# Patient Record
Sex: FEMALE | Race: WHITE | NOT HISPANIC OR LATINO | Employment: OTHER | URBAN - METROPOLITAN AREA
[De-identification: names, ages, dates, MRNs, and addresses within clinical notes are randomized per-mention and may not be internally consistent; named-entity substitution may affect disease eponyms.]

---

## 2017-01-01 PROBLEM — B34.2 CORONAVIRUS INFECTION: Status: ACTIVE | Noted: 2017-01-01

## 2017-07-26 RX ORDER — ACETAMINOPHEN 500 MG
1000 TABLET ORAL ONCE
Status: CANCELLED | OUTPATIENT
Start: 2017-07-27

## 2017-07-26 RX ORDER — CEFAZOLIN SODIUM 2 G/100ML
2 INJECTION, SOLUTION INTRAVENOUS ONCE
Status: CANCELLED | OUTPATIENT
Start: 2017-07-27

## 2017-08-01 ENCOUNTER — HOSPITAL ENCOUNTER (OUTPATIENT)
Dept: GENERAL RADIOLOGY | Facility: HOSPITAL | Age: 72
Discharge: HOME | End: 2017-08-01
Admitting: ORTHOPAEDIC SURGERY

## 2017-08-01 ENCOUNTER — APPOINTMENT (OUTPATIENT)
Dept: PREADMISSION TESTING | Facility: HOSPITAL | Age: 72
End: 2017-08-01

## 2017-08-01 ENCOUNTER — HOSPITAL ENCOUNTER (OUTPATIENT)
Dept: GENERAL RADIOLOGY | Facility: HOSPITAL | Age: 72
Discharge: HOME | End: 2017-08-01

## 2017-08-01 VITALS
OXYGEN SATURATION: 96 % | DIASTOLIC BLOOD PRESSURE: 84 MMHG | SYSTOLIC BLOOD PRESSURE: 122 MMHG | TEMPERATURE: 98.5 F | WEIGHT: 185 LBS | HEIGHT: 62 IN | HEART RATE: 70 BPM | RESPIRATION RATE: 16 BRPM | BODY MASS INDEX: 34.04 KG/M2

## 2017-08-01 LAB
ALBUMIN SERPL-MCNC: 4 G/DL (ref 3.5–5.2)
ALBUMIN/GLOB SERPL: 1.5 G/DL
ALP SERPL-CCNC: 78 U/L (ref 39–117)
ALT SERPL W P-5'-P-CCNC: 17 U/L (ref 1–33)
ANION GAP SERPL CALCULATED.3IONS-SCNC: 12.1 MMOL/L
APTT PPP: 28 SECONDS (ref 22.7–35.4)
AST SERPL-CCNC: 20 U/L (ref 1–32)
BASOPHILS # BLD AUTO: 0.02 10*3/MM3 (ref 0–0.2)
BASOPHILS NFR BLD AUTO: 0.4 % (ref 0–1.5)
BILIRUB SERPL-MCNC: 0.7 MG/DL (ref 0.1–1.2)
BILIRUB UR QL STRIP: NEGATIVE
BUN BLD-MCNC: 9 MG/DL (ref 8–23)
BUN/CREAT SERPL: 12 (ref 7–25)
CALCIUM SPEC-SCNC: 9 MG/DL (ref 8.6–10.5)
CHLORIDE SERPL-SCNC: 102 MMOL/L (ref 98–107)
CLARITY UR: ABNORMAL
CO2 SERPL-SCNC: 27.9 MMOL/L (ref 22–29)
COLOR UR: ABNORMAL
CREAT BLD-MCNC: 0.75 MG/DL (ref 0.57–1)
DEPRECATED RDW RBC AUTO: 51.2 FL (ref 37–54)
EOSINOPHIL # BLD AUTO: 0.12 10*3/MM3 (ref 0–0.7)
EOSINOPHIL NFR BLD AUTO: 2.4 % (ref 0.3–6.2)
ERYTHROCYTE [DISTWIDTH] IN BLOOD BY AUTOMATED COUNT: 13.3 % (ref 11.7–13)
GFR SERPL CREATININE-BSD FRML MDRD: 76 ML/MIN/1.73
GLOBULIN UR ELPH-MCNC: 2.7 GM/DL
GLUCOSE BLD-MCNC: 110 MG/DL (ref 65–99)
GLUCOSE UR STRIP-MCNC: NEGATIVE MG/DL
HCT VFR BLD AUTO: 40 % (ref 35.6–45.5)
HGB BLD-MCNC: 12.8 G/DL (ref 11.9–15.5)
HGB UR QL STRIP.AUTO: NEGATIVE
IMM GRANULOCYTES # BLD: 0 10*3/MM3 (ref 0–0.03)
IMM GRANULOCYTES NFR BLD: 0 % (ref 0–0.5)
INR PPP: 0.98 (ref 0.9–1.1)
KETONES UR QL STRIP: ABNORMAL
LEUKOCYTE ESTERASE UR QL STRIP.AUTO: NEGATIVE
LYMPHOCYTES # BLD AUTO: 0.92 10*3/MM3 (ref 0.9–4.8)
LYMPHOCYTES NFR BLD AUTO: 18.7 % (ref 19.6–45.3)
MCH RBC QN AUTO: 33.5 PG (ref 26.9–32)
MCHC RBC AUTO-ENTMCNC: 32 G/DL (ref 32.4–36.3)
MCV RBC AUTO: 104.7 FL (ref 80.5–98.2)
MONOCYTES # BLD AUTO: 0.34 10*3/MM3 (ref 0.2–1.2)
MONOCYTES NFR BLD AUTO: 6.9 % (ref 5–12)
NEUTROPHILS # BLD AUTO: 3.51 10*3/MM3 (ref 1.9–8.1)
NEUTROPHILS NFR BLD AUTO: 71.6 % (ref 42.7–76)
NITRITE UR QL STRIP: NEGATIVE
PH UR STRIP.AUTO: 6 [PH] (ref 5–8)
PLATELET # BLD AUTO: 166 10*3/MM3 (ref 140–500)
PMV BLD AUTO: 10.3 FL (ref 6–12)
POTASSIUM BLD-SCNC: 4.1 MMOL/L (ref 3.5–5.2)
PROT SERPL-MCNC: 6.7 G/DL (ref 6–8.5)
PROT UR QL STRIP: NEGATIVE
PROTHROMBIN TIME: 12.6 SECONDS (ref 11.7–14.2)
RBC # BLD AUTO: 3.82 10*6/MM3 (ref 3.9–5.2)
SODIUM BLD-SCNC: 142 MMOL/L (ref 136–145)
SP GR UR STRIP: 1.02 (ref 1–1.03)
UROBILINOGEN UR QL STRIP: ABNORMAL
WBC NRBC COR # BLD: 4.91 10*3/MM3 (ref 4.5–10.7)

## 2017-08-01 PROCEDURE — 81003 URINALYSIS AUTO W/O SCOPE: CPT | Performed by: ORTHOPAEDIC SURGERY

## 2017-08-01 PROCEDURE — 93010 ELECTROCARDIOGRAM REPORT: CPT | Performed by: INTERNAL MEDICINE

## 2017-08-01 PROCEDURE — A9270 NON-COVERED ITEM OR SERVICE: HCPCS | Performed by: ORTHOPAEDIC SURGERY

## 2017-08-01 PROCEDURE — 85610 PROTHROMBIN TIME: CPT | Performed by: ORTHOPAEDIC SURGERY

## 2017-08-01 PROCEDURE — 63710000001 MUPIROCIN 2 % OINTMENT 0.9 G SYRINGE: Performed by: ORTHOPAEDIC SURGERY

## 2017-08-01 PROCEDURE — 85025 COMPLETE CBC W/AUTO DIFF WBC: CPT | Performed by: ORTHOPAEDIC SURGERY

## 2017-08-01 PROCEDURE — 36415 COLL VENOUS BLD VENIPUNCTURE: CPT

## 2017-08-01 PROCEDURE — 71020 HC CHEST PA AND LATERAL: CPT

## 2017-08-01 PROCEDURE — 80053 COMPREHEN METABOLIC PANEL: CPT | Performed by: ORTHOPAEDIC SURGERY

## 2017-08-01 PROCEDURE — 93005 ELECTROCARDIOGRAM TRACING: CPT

## 2017-08-01 PROCEDURE — 73560 X-RAY EXAM OF KNEE 1 OR 2: CPT

## 2017-08-01 PROCEDURE — 85730 THROMBOPLASTIN TIME PARTIAL: CPT | Performed by: ORTHOPAEDIC SURGERY

## 2017-08-01 NOTE — DISCHARGE INSTRUCTIONS
Take the following medications the morning of surgery with a small sip of water:    METOPROLOL  IRBESARTAN  HYDROCODONE  BREO INHALER  SPIRIVA INHALER  ALBUTEROL INHALER      General Instructions:  • Do not eat solid food after midnight the night before surgery.  • You may drink clear liquids day of surgery but must stop at least one hour before your hospital arrival time @ 0900 AM STOP DRINKING!!!  • It is beneficial for you to have a clear drink that contains carbohydrates the day of surgery.  We suggest a 20 ounce bottle of Gatorade or Powerade for non-diabetic patients or a 20 ounce bottle of G2 or Powerade Zero for diabetic patients.     Clear liquids are liquids you can see through.  Nothing red in color.     Plain water                               Sports drinks  Sodas                                   Gelatin (Jell-O)  Fruit juices without pulp such as white grape juice and apple juice  Popsicles that contain no fruit or yogurt  Tea or coffee (no cream or milk added)  Gatorade / Powerade  G2 / Powerade Zero  • Patients who avoid smoking, chewing tobacco and alcohol for 4 weeks prior to surgery have a reduced risk of post-operative complications.  Quit smoking as many days before surgery as you can.  • Do not smoke, use chewing tobacco or drink alcohol the day of surgery.   • Bring your C-PAP machine.  • Bring any papers given to you in the doctor’s office.  • Wear clean comfortable clothes and socks.  • Do not wear contact lenses or make-up.  Bring a case for your glasses.   • Bring crutches or walker if applicable.  • Leave all other valuables and jewelry at home.  • The Pre-Admission Testing nurse will instruct you to bring medications if unable to obtain an accurate list in Pre-Admission Testing.        Preventing a Surgical Site Infection:  • For 2 to 3 days before surgery, avoid shaving with a razor because the razor can irritate skin and make it easier to develop an infection.  • The night prior to  surgery sleep in a clean bed with clean clothing.  Do not allow pets to sleep with you.  • Shower on the morning of surgery using a fresh bar of anti-bacterial soap (such as Dial) and clean washcloth.  Dry with a clean towel and dress in clean clothing.  • Ask your surgeon if you will be receiving antibiotics prior to surgery.  • Make sure you, your family, and all healthcare providers clean their hands with soap and water or an alcohol based hand  before caring for you or your wound.    Day of surgery: 08- ARRIVE @ 1000 AM REPORT TO THE MAIN OR   Upon arrival, a Pre-op nurse and Anesthesiologist will review your health history, obtain vital signs, and answer questions you may have.  The only belongings needed at this time will be your home medications and if applicable your C-PAP machine.  If you are staying overnight your family can leave the rest of your belongings in the car and bring them to your room later.  A Pre-op nurse will start an IV and you may receive medication in preparation for surgery, including something to help you relax.  Your family will be able to see you in the Pre-op area.  While you are in surgery your family should notify the waiting room  if they leave the waiting room area and provide a contact phone number.    Please be aware that surgery does come with discomfort.  We want to make every effort to control your discomfort so please discuss any uncontrolled symptoms with your nurse.   Your doctor will most likely have prescribed pain medications.      If you are going home after surgery you will receive individualized written care instructions before being discharged.  A responsible adult must drive you to and from the hospital on the day of your surgery and stay with you for 24 hours.    If you are staying overnight following surgery, you will be transported to your hospital room following the recovery period.  [facility] has all private  rooms.    If you have any questions please call Pre-Admission Testing at [phone].  Deductibles and co-payments are collected on the day of service. Please be prepared to pay the required co-pay, deductible or deposit on the day of service as defined by your plan.    2% CHLORAHEXIDINE GLUCONATE* CLOTH  Preparing or “prepping” skin before surgery can reduce the risk of infection at the surgical site. To make the process easier, Psychiatric has chosen disposable cloths moistened with a rinse-free, 2% Chlorhexidine Gluconate (CHG) antiseptic solution. The steps below outline the prepping process and should be carefully followed.        Use the prep cloth on the area that is circled in the diagram             Directions Night before Surgery 08- PM PRIOR TO SURGERY (LEFT LEG)  1) Shower using a fresh bar of anti-bacterial soap (such as Dial) and clean washcloth.  Use a clean towel to completely dry your skin.  2) Do not use any lotions, oils or creams on your skin.  3) Open the package and remove 1 cloth, wipe your skin for 30 seconds in a circular motion.  Allow to dry for 3 minutes.  4) Repeat #3 with second cloth.  5) Do not touch your eyes, ears, or mouth with the prep cloth.  6) Allow the wet prep solution to air dry.  7) Discard the prep cloth and wash your hands with soap and water.   8) Dress in clean bed clothes and sleep on fresh clean bed sheets.   9) You may experience some temporary itching after the prep.    Directions Day of Surgery 08- AM PRIOR TO SURGERY (LEFT LEG  1) Repeat steps 1,2,3,4,5,6,7, and 9.   2) Dress in clean clothes before coming to the hospital.    BACTROBAN NASAL OINTMENT  There are many germs normally in your nose. Bactroban is an ointment that will help reduce these germs. Please follow these instructions for Bactroban use:    ____The day before surgery in the morning  [id number]_______    ____The day before surgery in the evening               [id number]_______    ____The day of surgery in the morning    [id number]_______    **Squirt ½ package of Bactroban Ointment onto a cotton applicator and apply to inside of 1st nostril.  Squirt the remaining Bactroban and apply to the inside of the other nostril.

## 2017-08-02 NOTE — PROGRESS NOTES
Pre-Operative Orthopedic Assessment      Patient: Marylu Lunsford    YOB: 1945      Age/Gender: 72 y.o. female  Medical Record Number: [id number]  Surgical Procedure Planned: ID TOTAL KNEE ARTHROPLASTY [[id number]] (LT TOTAL KNEE ARTHROPLASTY)     Surgeon: Julian Oshea MD    Date of Surgery Planned: 8/14/2017    Question Value Score    Patient Score   1. What is your age group? 50-65 years  66-75 years  >75 years =2  =1  =0 1   2. Gender? Male  Female =2  =1 1   3. How far on average can you walk? (a block is 200 meters) Two blocks or more (+\- rest)  1-2 blocks (+\- rest)  Housebound (most of time) =2  =1  =0 1   4. Which gait aid to you use? (more often than not) None  Single-Point Stick  Crutches/Frame =2  =1  =0 1   5. Do you use community  supports? (home help, meals on wheels, district nursing) None or one per week  Two or more per week =1  =0 1   6. Will you live with someone who can care for you after your operation? Yes  No =3  =0 3      Your Score (out of 12)  8     Key Destination at Discharge from acute care predicted by score   Scores < 6  -- extended inpatient rehabilitation   Scores 6-9 -- additional intervention to discharge directly home (Rehabilitation in home)   Scores > 9 -- directly home         Discharge Disposition/Planning:     Patient Response   Discussed the Predicted discharge disposition needed based on RAPT Assessment with the patient.    yes   Patient selected discharge disposition:   Home vs SNF ([facility]   Out Patient Rehabilitation Facility of Choice:    n/a   Home Health Services Preferred:   undecided   Has the patient completed or been scheduled to complete pre-operative testing? Completed   Post-Operative Care Giver Name and Phone Number:    Undecided at this time     Subacute Inpatient Rehabilitation:  Complete this section only if planning inpatient services at a Subacute Facility     Patient Response   Subacute Facility Preferred (Please  list 2 facilities:   [facility] (pre registered)   Requires pre-certification for inpatient rehabilitation services?      No (Medicare)   Planned source of transportation to inpatient rehabilitation facility?   family    If choosing inpatient services at an Acute or Subacute Facility please list a subsequent back-up plan (in case patient fails to qualify for inpatient rehabilitation). Back-up plans should include caregiver (family member or friend) for first 24-48 post- -operatively.    yes   Home Equipment Patient Response   Does patient have a walker for home use?    yes   Does patient have a 3 in 1 commode for home use?    no   Does patient have a shower chair for home use?    no   Does patient have an elevated commode seat for home use? yes   Does patient have a cane for home use?    yes   Is there any other medical equipment in the home? If so,  List in comment section below no   Pre-Operative Class Attendance Patient Response   Attended or scheduled to attend the pre-operative class within 1 year of total joint replacement? Provided info on live class and online, unsure if will attend   Not planning to attend the pre-operative class (see comments below)    Hx of R/L hip surgery in the past   Patient Education  Completed   Expected time of discharge discussed yes   Encouraged to attend Pre-Operative Class    yes   Education re: Back-up plan for patients planning discharge to subacute facilities yes   Education re: Predicted Discharge Disposition based on RAPT score    no   Patient receptive and voiced understanding of information given    yes                                                                                                            Comments:    Spoke with patient Marylu Lunsford at [phone] via return call.  Verified home address to be correct on face sheet of [phone] [place] [address].  Lives alone in a house.  3 steps to the front entry with railing.  Everything she needs is all  on main level.  She stated that she uses a cane.  Hx of R/L hip surgery in the past.  Stated that she may be able to piece together help in the home of family, but did pre register for rehab stay at [facility].  Patient current discharge plan was to go to [facility] at discharge, but feel that she may be able to piece together help in the home and have home health follow (need to follow up with patient on home health agency preference if able to return home).  Kelsey Fang RN     8/7/2017 1332:Received inbound call from patient who informs that she hasn't been able to get anyone to say that they would stay with her after surgery.  She mentioned dtr is an ortho nurse and noted that she needed someone with her for at least 5 days after discharge.  Informed patient cant guarantee how long patient will be in the hospital, and if she will end up having a qualifying stay under medicare.  If doesn't have 3 midnights would be private pay at a skilled rehab facility.  Patient could also hire in-home care giver as needed if family/friends unable to assist, informed could provide list if needed. Recommended patient to also discuss home vs rehab with MD.  She is pre registered at [facility].  Kelsey Fang RN                                                Signature: Kelsey Fang RN    Date:  8/2/2017

## 2017-08-14 ENCOUNTER — HOSPITAL ENCOUNTER (INPATIENT)
Facility: HOSPITAL | Age: 72
LOS: 5 days | Discharge: NURSING, REHAB OR LONG-TERM CARE | End: 2017-08-19
Attending: ORTHOPAEDIC SURGERY | Admitting: ORTHOPAEDIC SURGERY

## 2017-08-14 ENCOUNTER — ANESTHESIA (OUTPATIENT)
Dept: PERIOP | Facility: HOSPITAL | Age: 72
End: 2017-08-14

## 2017-08-14 ENCOUNTER — ANESTHESIA EVENT (OUTPATIENT)
Dept: PERIOP | Facility: HOSPITAL | Age: 72
End: 2017-08-14

## 2017-08-14 ENCOUNTER — APPOINTMENT (OUTPATIENT)
Dept: GENERAL RADIOLOGY | Facility: HOSPITAL | Age: 72
End: 2017-08-14

## 2017-08-14 PROBLEM — M17.9 OSTEOARTHRITIS, KNEE: Status: ACTIVE | Noted: 2017-08-14

## 2017-08-14 PROCEDURE — 94799 UNLISTED PULMONARY SVC/PX: CPT

## 2017-08-14 PROCEDURE — C1713 ANCHOR/SCREW BN/BN,TIS/BN: HCPCS | Performed by: ORTHOPAEDIC SURGERY

## 2017-08-14 PROCEDURE — 25010000002 HYDROMORPHONE PER 4 MG: Performed by: NURSE ANESTHETIST, CERTIFIED REGISTERED

## 2017-08-14 PROCEDURE — 25010000003 CEFAZOLIN IN DEXTROSE 2-4 GM/100ML-% SOLUTION: Performed by: ORTHOPAEDIC SURGERY

## 2017-08-14 PROCEDURE — 0SRD0J9 REPLACEMENT OF LEFT KNEE JOINT WITH SYNTHETIC SUBSTITUTE, CEMENTED, OPEN APPROACH: ICD-10-PCS | Performed by: ORTHOPAEDIC SURGERY

## 2017-08-14 PROCEDURE — 94640 AIRWAY INHALATION TREATMENT: CPT

## 2017-08-14 PROCEDURE — 25010000002 MIDAZOLAM PER 1 MG: Performed by: ANESTHESIOLOGY

## 2017-08-14 PROCEDURE — 25010000002 ONDANSETRON PER 1 MG: Performed by: NURSE ANESTHETIST, CERTIFIED REGISTERED

## 2017-08-14 PROCEDURE — 73560 X-RAY EXAM OF KNEE 1 OR 2: CPT

## 2017-08-14 PROCEDURE — 25010000002 FENTANYL CITRATE (PF) 100 MCG/2ML SOLUTION: Performed by: NURSE ANESTHETIST, CERTIFIED REGISTERED

## 2017-08-14 PROCEDURE — 25010000003 CEFAZOLIN IN DEXTROSE 2-4 GM/100ML-% SOLUTION: Performed by: NURSE ANESTHETIST, CERTIFIED REGISTERED

## 2017-08-14 PROCEDURE — C1776 JOINT DEVICE (IMPLANTABLE): HCPCS | Performed by: ORTHOPAEDIC SURGERY

## 2017-08-14 PROCEDURE — 25010000002 KETOROLAC TROMETHAMINE PER 15 MG: Performed by: ORTHOPAEDIC SURGERY

## 2017-08-14 PROCEDURE — 25010000002 VANCOMYCIN 10 G RECONSTITUTED SOLUTION: Performed by: ORTHOPAEDIC SURGERY

## 2017-08-14 PROCEDURE — 25010000002 PHENYLEPHRINE PER 1 ML: Performed by: NURSE ANESTHETIST, CERTIFIED REGISTERED

## 2017-08-14 PROCEDURE — 25010000002 PROPOFOL 10 MG/ML EMULSION: Performed by: NURSE ANESTHETIST, CERTIFIED REGISTERED

## 2017-08-14 DEVICE — ART/SRF KN PERSONA/VE PS EF 6TO7 10MM LT: Type: IMPLANTABLE DEVICE | Status: FUNCTIONAL

## 2017-08-14 DEVICE — CAP TOTL KN CMT PRIMARY: Type: IMPLANTABLE DEVICE | Status: FUNCTIONAL

## 2017-08-14 DEVICE — IMPLANTABLE DEVICE: Type: IMPLANTABLE DEVICE | Status: FUNCTIONAL

## 2017-08-14 DEVICE — COMP FEM/KN PERSONA CR CMT COCR STD SZ6 LT: Type: IMPLANTABLE DEVICE | Status: FUNCTIONAL

## 2017-08-14 DEVICE — STEM TIB/KN PERSONA CMT 5D SZE LT: Type: IMPLANTABLE DEVICE | Status: FUNCTIONAL

## 2017-08-14 DEVICE — CMT BONE PALACOS 120001: Type: IMPLANTABLE DEVICE | Site: KNEE | Status: FUNCTIONAL

## 2017-08-14 RX ORDER — KETOROLAC TROMETHAMINE 30 MG/ML
30 INJECTION, SOLUTION INTRAMUSCULAR; INTRAVENOUS EVERY 6 HOURS
Status: DISPENSED | OUTPATIENT
Start: 2017-08-14 | End: 2017-08-15

## 2017-08-14 RX ORDER — ATORVASTATIN CALCIUM 20 MG/1
20 TABLET, FILM COATED ORAL DAILY
Status: DISCONTINUED | OUTPATIENT
Start: 2017-08-14 | End: 2017-08-19 | Stop reason: HOSPADM

## 2017-08-14 RX ORDER — MIDAZOLAM HYDROCHLORIDE 1 MG/ML
1 INJECTION INTRAMUSCULAR; INTRAVENOUS
Status: DISCONTINUED | OUTPATIENT
Start: 2017-08-14 | End: 2017-08-14 | Stop reason: HOSPADM

## 2017-08-14 RX ORDER — HYDRALAZINE HYDROCHLORIDE 20 MG/ML
5 INJECTION INTRAMUSCULAR; INTRAVENOUS
Status: DISCONTINUED | OUTPATIENT
Start: 2017-08-14 | End: 2017-08-14 | Stop reason: HOSPADM

## 2017-08-14 RX ORDER — ALBUTEROL SULFATE 2.5 MG/3ML
2.5 SOLUTION RESPIRATORY (INHALATION) ONCE AS NEEDED
Status: DISCONTINUED | OUTPATIENT
Start: 2017-08-14 | End: 2017-08-14 | Stop reason: HOSPADM

## 2017-08-14 RX ORDER — CLONAZEPAM 0.5 MG/1
0.75 TABLET ORAL 2 TIMES DAILY PRN
Status: DISCONTINUED | OUTPATIENT
Start: 2017-08-14 | End: 2017-08-16

## 2017-08-14 RX ORDER — SODIUM CHLORIDE, SODIUM LACTATE, POTASSIUM CHLORIDE, CALCIUM CHLORIDE 600; 310; 30; 20 MG/100ML; MG/100ML; MG/100ML; MG/100ML
9 INJECTION, SOLUTION INTRAVENOUS CONTINUOUS
Status: DISCONTINUED | OUTPATIENT
Start: 2017-08-14 | End: 2017-08-19 | Stop reason: HOSPADM

## 2017-08-14 RX ORDER — DIPHENHYDRAMINE HYDROCHLORIDE 50 MG/ML
12.5 INJECTION INTRAMUSCULAR; INTRAVENOUS
Status: DISCONTINUED | OUTPATIENT
Start: 2017-08-14 | End: 2017-08-14 | Stop reason: HOSPADM

## 2017-08-14 RX ORDER — SODIUM CHLORIDE, SODIUM LACTATE, POTASSIUM CHLORIDE, CALCIUM CHLORIDE 600; 310; 30; 20 MG/100ML; MG/100ML; MG/100ML; MG/100ML
100 INJECTION, SOLUTION INTRAVENOUS CONTINUOUS
Status: DISCONTINUED | OUTPATIENT
Start: 2017-08-14 | End: 2017-08-19 | Stop reason: HOSPADM

## 2017-08-14 RX ORDER — BISACODYL 10 MG
10 SUPPOSITORY, RECTAL RECTAL DAILY PRN
Status: DISCONTINUED | OUTPATIENT
Start: 2017-08-14 | End: 2017-08-19 | Stop reason: HOSPADM

## 2017-08-14 RX ORDER — PANTOPRAZOLE SODIUM 40 MG/1
40 TABLET, DELAYED RELEASE ORAL EVERY MORNING
Status: DISCONTINUED | OUTPATIENT
Start: 2017-08-15 | End: 2017-08-19 | Stop reason: HOSPADM

## 2017-08-14 RX ORDER — ONDANSETRON 2 MG/ML
4 INJECTION INTRAMUSCULAR; INTRAVENOUS ONCE AS NEEDED
Status: DISCONTINUED | OUTPATIENT
Start: 2017-08-14 | End: 2017-08-14 | Stop reason: HOSPADM

## 2017-08-14 RX ORDER — CEFAZOLIN SODIUM 2 G/100ML
2 INJECTION, SOLUTION INTRAVENOUS EVERY 8 HOURS
Status: COMPLETED | OUTPATIENT
Start: 2017-08-14 | End: 2017-08-15

## 2017-08-14 RX ORDER — FENTANYL CITRATE 50 UG/ML
50 INJECTION, SOLUTION INTRAMUSCULAR; INTRAVENOUS
Status: DISCONTINUED | OUTPATIENT
Start: 2017-08-14 | End: 2017-08-14 | Stop reason: HOSPADM

## 2017-08-14 RX ORDER — SENNA AND DOCUSATE SODIUM 50; 8.6 MG/1; MG/1
2 TABLET, FILM COATED ORAL 2 TIMES DAILY
Status: DISCONTINUED | OUTPATIENT
Start: 2017-08-14 | End: 2017-08-19 | Stop reason: HOSPADM

## 2017-08-14 RX ORDER — CEFAZOLIN SODIUM 2 G/100ML
INJECTION, SOLUTION INTRAVENOUS AS NEEDED
Status: DISCONTINUED | OUTPATIENT
Start: 2017-08-14 | End: 2017-08-14 | Stop reason: SURG

## 2017-08-14 RX ORDER — OXYCODONE HYDROCHLORIDE AND ACETAMINOPHEN 5; 325 MG/1; MG/1
2 TABLET ORAL EVERY 4 HOURS PRN
Status: DISCONTINUED | OUTPATIENT
Start: 2017-08-14 | End: 2017-08-16

## 2017-08-14 RX ORDER — DIPHENHYDRAMINE HCL 25 MG
25 CAPSULE ORAL EVERY 6 HOURS PRN
Status: DISCONTINUED | OUTPATIENT
Start: 2017-08-14 | End: 2017-08-19 | Stop reason: HOSPADM

## 2017-08-14 RX ORDER — SODIUM CHLORIDE 0.9 % (FLUSH) 0.9 %
1-10 SYRINGE (ML) INJECTION AS NEEDED
Status: DISCONTINUED | OUTPATIENT
Start: 2017-08-14 | End: 2017-08-19 | Stop reason: HOSPADM

## 2017-08-14 RX ORDER — IRBESARTAN 300 MG/1
300 TABLET ORAL DAILY
Status: DISCONTINUED | OUTPATIENT
Start: 2017-08-14 | End: 2017-08-15 | Stop reason: CLARIF

## 2017-08-14 RX ORDER — SODIUM CHLORIDE 0.9 % (FLUSH) 0.9 %
1-10 SYRINGE (ML) INJECTION AS NEEDED
Status: DISCONTINUED | OUTPATIENT
Start: 2017-08-14 | End: 2017-08-14 | Stop reason: HOSPADM

## 2017-08-14 RX ORDER — TRANEXAMIC ACID 100 MG/ML
INJECTION, SOLUTION INTRAVENOUS AS NEEDED
Status: DISCONTINUED | OUTPATIENT
Start: 2017-08-14 | End: 2017-08-14 | Stop reason: SURG

## 2017-08-14 RX ORDER — FENTANYL CITRATE 50 UG/ML
INJECTION, SOLUTION INTRAMUSCULAR; INTRAVENOUS AS NEEDED
Status: DISCONTINUED | OUTPATIENT
Start: 2017-08-14 | End: 2017-08-14 | Stop reason: SURG

## 2017-08-14 RX ORDER — MIDAZOLAM HYDROCHLORIDE 1 MG/ML
2 INJECTION INTRAMUSCULAR; INTRAVENOUS
Status: DISCONTINUED | OUTPATIENT
Start: 2017-08-14 | End: 2017-08-14 | Stop reason: HOSPADM

## 2017-08-14 RX ORDER — ASPIRIN 325 MG
325 TABLET, DELAYED RELEASE (ENTERIC COATED) ORAL DAILY
Status: DISCONTINUED | OUTPATIENT
Start: 2017-08-14 | End: 2017-08-17

## 2017-08-14 RX ORDER — ACETAMINOPHEN 325 MG/1
325 TABLET ORAL EVERY 4 HOURS PRN
Status: DISCONTINUED | OUTPATIENT
Start: 2017-08-14 | End: 2017-08-19 | Stop reason: HOSPADM

## 2017-08-14 RX ORDER — ONDANSETRON 2 MG/ML
INJECTION INTRAMUSCULAR; INTRAVENOUS AS NEEDED
Status: DISCONTINUED | OUTPATIENT
Start: 2017-08-14 | End: 2017-08-14 | Stop reason: SURG

## 2017-08-14 RX ORDER — HYDROMORPHONE HYDROCHLORIDE 1 MG/ML
0.5 INJECTION, SOLUTION INTRAMUSCULAR; INTRAVENOUS; SUBCUTANEOUS
Status: DISCONTINUED | OUTPATIENT
Start: 2017-08-14 | End: 2017-08-14 | Stop reason: HOSPADM

## 2017-08-14 RX ORDER — OXYCODONE HYDROCHLORIDE AND ACETAMINOPHEN 5; 325 MG/1; MG/1
1 TABLET ORAL EVERY 4 HOURS PRN
Status: DISCONTINUED | OUTPATIENT
Start: 2017-08-14 | End: 2017-08-16

## 2017-08-14 RX ORDER — NALOXONE HCL 0.4 MG/ML
0.1 VIAL (ML) INJECTION
Status: DISCONTINUED | OUTPATIENT
Start: 2017-08-14 | End: 2017-08-19 | Stop reason: HOSPADM

## 2017-08-14 RX ORDER — TERAZOSIN 2 MG/1
2 CAPSULE ORAL NIGHTLY
Status: DISCONTINUED | OUTPATIENT
Start: 2017-08-14 | End: 2017-08-19 | Stop reason: HOSPADM

## 2017-08-14 RX ORDER — ONDANSETRON 2 MG/ML
4 INJECTION INTRAMUSCULAR; INTRAVENOUS EVERY 6 HOURS PRN
Status: DISCONTINUED | OUTPATIENT
Start: 2017-08-14 | End: 2017-08-19 | Stop reason: HOSPADM

## 2017-08-14 RX ORDER — GLYCOPYRROLATE 0.2 MG/ML
INJECTION INTRAMUSCULAR; INTRAVENOUS AS NEEDED
Status: DISCONTINUED | OUTPATIENT
Start: 2017-08-14 | End: 2017-08-14 | Stop reason: SURG

## 2017-08-14 RX ORDER — METOPROLOL SUCCINATE 100 MG/1
100 TABLET, EXTENDED RELEASE ORAL DAILY
Status: DISCONTINUED | OUTPATIENT
Start: 2017-08-14 | End: 2017-08-19 | Stop reason: HOSPADM

## 2017-08-14 RX ORDER — ACETAMINOPHEN 500 MG
1000 TABLET ORAL ONCE
Status: COMPLETED | OUTPATIENT
Start: 2017-08-14 | End: 2017-08-14

## 2017-08-14 RX ORDER — ALBUTEROL SULFATE 2.5 MG/3ML
2.5 SOLUTION RESPIRATORY (INHALATION) EVERY 4 HOURS PRN
Status: DISCONTINUED | OUTPATIENT
Start: 2017-08-14 | End: 2017-08-19 | Stop reason: HOSPADM

## 2017-08-14 RX ORDER — BUDESONIDE AND FORMOTEROL FUMARATE DIHYDRATE 80; 4.5 UG/1; UG/1
2 AEROSOL RESPIRATORY (INHALATION)
Status: DISCONTINUED | OUTPATIENT
Start: 2017-08-14 | End: 2017-08-19 | Stop reason: HOSPADM

## 2017-08-14 RX ORDER — BISACODYL 5 MG/1
10 TABLET, DELAYED RELEASE ORAL DAILY PRN
Status: DISCONTINUED | OUTPATIENT
Start: 2017-08-14 | End: 2017-08-19 | Stop reason: HOSPADM

## 2017-08-14 RX ORDER — MAGNESIUM HYDROXIDE 1200 MG/15ML
LIQUID ORAL
Status: DISCONTINUED | OUTPATIENT
Start: 1840-12-31 | End: 2017-08-14 | Stop reason: HOSPADM

## 2017-08-14 RX ORDER — ONDANSETRON 4 MG/1
4 TABLET, FILM COATED ORAL EVERY 6 HOURS PRN
Status: DISCONTINUED | OUTPATIENT
Start: 2017-08-14 | End: 2017-08-19 | Stop reason: HOSPADM

## 2017-08-14 RX ORDER — DULOXETIN HYDROCHLORIDE 60 MG/1
60 CAPSULE, DELAYED RELEASE ORAL DAILY
Status: DISCONTINUED | OUTPATIENT
Start: 2017-08-14 | End: 2017-08-15

## 2017-08-14 RX ORDER — LABETALOL HYDROCHLORIDE 5 MG/ML
5 INJECTION, SOLUTION INTRAVENOUS
Status: DISCONTINUED | OUTPATIENT
Start: 2017-08-14 | End: 2017-08-14 | Stop reason: HOSPADM

## 2017-08-14 RX ORDER — FUROSEMIDE 20 MG/1
20 TABLET ORAL DAILY
Status: DISCONTINUED | OUTPATIENT
Start: 2017-08-14 | End: 2017-08-19 | Stop reason: HOSPADM

## 2017-08-14 RX ORDER — FAMOTIDINE 10 MG/ML
20 INJECTION, SOLUTION INTRAVENOUS ONCE
Status: COMPLETED | OUTPATIENT
Start: 2017-08-14 | End: 2017-08-14

## 2017-08-14 RX ORDER — ONDANSETRON 4 MG/1
4 TABLET, ORALLY DISINTEGRATING ORAL EVERY 6 HOURS PRN
Status: DISCONTINUED | OUTPATIENT
Start: 2017-08-14 | End: 2017-08-19 | Stop reason: HOSPADM

## 2017-08-14 RX ORDER — ALBUTEROL SULFATE 90 UG/1
2 AEROSOL, METERED RESPIRATORY (INHALATION) EVERY 4 HOURS PRN
Status: DISCONTINUED | OUTPATIENT
Start: 2017-08-14 | End: 2017-08-14 | Stop reason: CLARIF

## 2017-08-14 RX ORDER — ACETAMINOPHEN 325 MG/1
650 TABLET ORAL EVERY 4 HOURS PRN
Status: DISCONTINUED | OUTPATIENT
Start: 2017-08-14 | End: 2017-08-19 | Stop reason: HOSPADM

## 2017-08-14 RX ORDER — LIDOCAINE HYDROCHLORIDE 20 MG/ML
INJECTION, SOLUTION INFILTRATION; PERINEURAL AS NEEDED
Status: DISCONTINUED | OUTPATIENT
Start: 2017-08-14 | End: 2017-08-14 | Stop reason: SURG

## 2017-08-14 RX ORDER — FERROUS SULFATE 325(65) MG
325 TABLET ORAL
Status: DISCONTINUED | OUTPATIENT
Start: 2017-08-15 | End: 2017-08-19 | Stop reason: HOSPADM

## 2017-08-14 RX ORDER — PROPOFOL 10 MG/ML
VIAL (ML) INTRAVENOUS AS NEEDED
Status: DISCONTINUED | OUTPATIENT
Start: 2017-08-14 | End: 2017-08-14 | Stop reason: SURG

## 2017-08-14 RX ADMIN — FENTANYL CITRATE 25 MCG: 50 INJECTION INTRAMUSCULAR; INTRAVENOUS at 14:16

## 2017-08-14 RX ADMIN — VANCOMYCIN HYDROCHLORIDE 1500 MG: 10 INJECTION, POWDER, LYOPHILIZED, FOR SOLUTION INTRAVENOUS at 12:15

## 2017-08-14 RX ADMIN — BUDESONIDE AND FORMOTEROL FUMARATE DIHYDRATE 2 PUFF: 80; 4.5 AEROSOL RESPIRATORY (INHALATION) at 20:54

## 2017-08-14 RX ADMIN — PROPOFOL 230 MG: 10 INJECTION, EMULSION INTRAVENOUS at 13:43

## 2017-08-14 RX ADMIN — FENTANYL CITRATE 25 MCG: 50 INJECTION INTRAMUSCULAR; INTRAVENOUS at 13:50

## 2017-08-14 RX ADMIN — ONDANSETRON 4 MG: 2 INJECTION INTRAMUSCULAR; INTRAVENOUS at 13:49

## 2017-08-14 RX ADMIN — FENTANYL CITRATE 200 MCG: 50 INJECTION INTRAMUSCULAR; INTRAVENOUS at 13:58

## 2017-08-14 RX ADMIN — DULOXETINE HYDROCHLORIDE 60 MG: 60 CAPSULE, DELAYED RELEASE ORAL at 23:39

## 2017-08-14 RX ADMIN — HYDROMORPHONE HYDROCHLORIDE 0.5 MG: 1 INJECTION, SOLUTION INTRAMUSCULAR; INTRAVENOUS; SUBCUTANEOUS at 16:40

## 2017-08-14 RX ADMIN — CEFAZOLIN SODIUM 2 G: 2 INJECTION, SOLUTION INTRAVENOUS at 15:02

## 2017-08-14 RX ADMIN — OXYCODONE HYDROCHLORIDE AND ACETAMINOPHEN 2 TABLET: 5; 325 TABLET ORAL at 19:32

## 2017-08-14 RX ADMIN — OXYCODONE HYDROCHLORIDE AND ACETAMINOPHEN 2 TABLET: 5; 325 TABLET ORAL at 23:13

## 2017-08-14 RX ADMIN — CEFAZOLIN SODIUM 2 G: 2 INJECTION, SOLUTION INTRAVENOUS at 23:14

## 2017-08-14 RX ADMIN — TIOTROPIUM BROMIDE 1 CAPSULE: 18 CAPSULE ORAL; RESPIRATORY (INHALATION) at 20:53

## 2017-08-14 RX ADMIN — ASPIRIN 325 MG: 325 TABLET, DELAYED RELEASE ORAL at 23:16

## 2017-08-14 RX ADMIN — HYDROMORPHONE HYDROCHLORIDE 0.5 MG: 1 INJECTION, SOLUTION INTRAMUSCULAR; INTRAVENOUS; SUBCUTANEOUS at 16:12

## 2017-08-14 RX ADMIN — KETOROLAC TROMETHAMINE 30 MG: 30 INJECTION, SOLUTION INTRAMUSCULAR at 23:14

## 2017-08-14 RX ADMIN — ATORVASTATIN CALCIUM 20 MG: 20 TABLET, FILM COATED ORAL at 23:40

## 2017-08-14 RX ADMIN — ACETAMINOPHEN 1000 MG: 500 TABLET ORAL at 12:16

## 2017-08-14 RX ADMIN — DOCUSATE SODIUM -SENNOSIDES 2 TABLET: 50; 8.6 TABLET, COATED ORAL at 23:40

## 2017-08-14 RX ADMIN — PHENYLEPHRINE HYDROCHLORIDE 100 MCG: 10 INJECTION INTRAVENOUS at 13:52

## 2017-08-14 RX ADMIN — LIDOCAINE HYDROCHLORIDE 40 MG: 20 INJECTION, SOLUTION INFILTRATION; PERINEURAL at 13:43

## 2017-08-14 RX ADMIN — MIDAZOLAM 2 MG: 1 INJECTION INTRAMUSCULAR; INTRAVENOUS at 12:29

## 2017-08-14 RX ADMIN — FAMOTIDINE 20 MG: 10 INJECTION, SOLUTION INTRAVENOUS at 12:29

## 2017-08-14 RX ADMIN — SODIUM CHLORIDE, POTASSIUM CHLORIDE, SODIUM LACTATE AND CALCIUM CHLORIDE 100 ML/HR: 600; 310; 30; 20 INJECTION, SOLUTION INTRAVENOUS at 23:16

## 2017-08-14 RX ADMIN — TRANEXAMIC ACID 800 MG: 100 INJECTION, SOLUTION INTRAVENOUS at 14:43

## 2017-08-14 RX ADMIN — GLYCOPYRROLATE 0.2 MG: 0.2 INJECTION INTRAMUSCULAR; INTRAVENOUS at 13:58

## 2017-08-14 RX ADMIN — FENTANYL CITRATE 100 MCG: 50 INJECTION INTRAMUSCULAR; INTRAVENOUS at 14:08

## 2017-08-14 RX ADMIN — SODIUM CHLORIDE, POTASSIUM CHLORIDE, SODIUM LACTATE AND CALCIUM CHLORIDE: 600; 310; 30; 20 INJECTION, SOLUTION INTRAVENOUS at 15:06

## 2017-08-14 RX ADMIN — SODIUM CHLORIDE, POTASSIUM CHLORIDE, SODIUM LACTATE AND CALCIUM CHLORIDE 9 ML/HR: 600; 310; 30; 20 INJECTION, SOLUTION INTRAVENOUS at 12:20

## 2017-08-14 RX ADMIN — PHENYLEPHRINE HYDROCHLORIDE 50 MCG: 10 INJECTION INTRAVENOUS at 13:47

## 2017-08-14 NOTE — ANESTHESIA PREPROCEDURE EVALUATION
Anesthesia Evaluation     no history of anesthetic complications:         Airway   Mallampati: III  no difficulty expected  Dental    (+) lower dentures    Pulmonary - normal exam   (+) pneumonia (jan '17) resolved , a smoker (none today, cessation advised) Current, COPD, sleep apnea on CPAP,   (-) asthma    PE comment: nonlabored  Cardiovascular - normal exam    Rhythm: regular  Rate: normal    (+) hypertension, dysrhythmias (w/ PNA episode but none otherwise) Atrial Fib, hyperlipidemia  (-) valvular problems/murmurs, past MI, CAD, angina      Neuro/Psych  (+) syncope, psychiatric history Anxiety and Depression,    (-) seizures, TIA, CVA  GI/Hepatic/Renal/Endo    (+) obesity,    (-) GERD, liver disease, diabetes, hypothyroidism, hyperthyroidism    Musculoskeletal     (+) arthralgias, back pain,   Abdominal    Substance History      OB/GYN          Other   (+) arthritis       Other Comment: Venous insuff.                                  Anesthesia Plan    ASA 3     general     intravenous induction   Anesthetic plan and risks discussed with patient.

## 2017-08-14 NOTE — ANESTHESIA POSTPROCEDURE EVALUATION
Patient: Marylu Lunsford    Procedure Summary     Date Anesthesia Start Anesthesia Stop Room / Location    08/14/17 1340 1527 [facility] OR 10 / [facility] MAIN [facility]       Procedure Diagnosis Surgeon Provider    LT TOTAL KNEE ARTHROPLASTY (Left Knee) No diagnosis on file. MD Chris Hammer MD          Anesthesia Type: general  Last vitals  BP   151/87 (08/14/17 1730)    Temp        Pulse   64 (08/14/17 1730)   Resp   20 (08/14/17 1730)    SpO2   91 % (08/14/17 1730)      Post Anesthesia Care and Evaluation    Patient location during evaluation: PACU  Patient participation: complete - patient participated  Level of consciousness: awake  Pain score: 2  Pain management: adequate  Airway patency: patent  Anesthetic complications: No anesthetic complications  PONV Status: none  Cardiovascular status: acceptable  Respiratory status: acceptable  Hydration status: acceptable

## 2017-08-14 NOTE — PLAN OF CARE
Problem: Patient Care Overview (Adult)  Goal: Plan of Care Review  Outcome: Ongoing (interventions implemented as appropriate)    08/14/17 1141   Coping/Psychosocial Response Interventions   Plan Of Care Reviewed With patient   Patient Care Overview   Progress no change       Goal: Adult Individualization and Mutuality  Outcome: Ongoing (interventions implemented as appropriate)    08/14/17 1141   Individualization   Patient Specific Preferences None       Goal: Discharge Needs Assessment  Outcome: Ongoing (interventions implemented as appropriate)    08/14/17 1141   Discharge Needs Assessment   Concerns To Be Addressed no discharge needs identified         Problem: Perioperative Period (Adult)  Goal: Signs and Symptoms of Listed Potential Problems Will be Absent or Manageable (Perioperative Period)  Outcome: Ongoing (interventions implemented as appropriate)    08/14/17 1141   Perioperative Period   Problems Assessed (Perioperative Period) pain;infection   Problems Present (Perioperative Period) pain

## 2017-08-14 NOTE — PLAN OF CARE
Problem: Perioperative Period (Adult)  Goal: Signs and Symptoms of Listed Potential Problems Will be Absent or Manageable (Perioperative Period)  Outcome: Ongoing (interventions implemented as appropriate)    08/14/17 1610   Perioperative Period   Problems Assessed (Perioperative Period) all   Problems Present (Perioperative Period) pain

## 2017-08-14 NOTE — OP NOTE
Julian Oshea M.D. - [facility]    Patient Name:  Marylu Lunsford  YOB: 1945  Medical Records Number:  [id number]    Date of Procedure:  8/14/2017    Pre-operative Diagnosis:  DJD Left Knee    Post-operative Diagnosis:  DJD Left Knee    Procedure Performed:  Left Total Knee Replacement     Anesthesia: General, supplemented by a periarticular Exparel/bupivacaine injection.      Surgeon: Julian Oshea MD     Assistant: Mike Naylor MD; note that as part of the surgical procedure, I utilized service of an assistant surgeon, specifically Mike Naylor MD. Assistant surgeon participated in crucial portion of the operation. Use of the assistant surgeon greatly reduced overall operative time, thus significantly reducing overall morbidity for the patient.      Implants:      Implant Name Type Inv. Item Serial No.  Lot No. LRB No. Used Action   CMT BONE PALACOS [id number] - [id number] Implant CMT BONE PALACOS [id number]  [facility] [facility] [id number] Left 2 Implanted   COMP FEM PERSONA CR CMT COCR STD SZ6 LT - [id number] Implant COMP FEM PERSONA CR CMT COCR STD SZ6 LT  [employer] [facility] [id number] Left 1 Implanted   PAT PERSONA ALLPOLY CMT 8X29MM - [id number] Implant PAT PERSONA ALLPOLY CMT 8X29MM  [employer] [facility] [id number] Left 1 Implanted   STEM TIB PERSONA CMT 5D KIERRA LT - [id number] Implant STEM TIB PERSONA CMT 5D KIERRA LT  [employer] [facility] [id number] Left 1 Implanted   ART/SRF KN PERSONA/VE PS EF 6TO7 10MM LT - [id number] Implant ART/SRF KN PERSONA/VE PS EF 6TO7 10MM LT   [employer] [facility] [id number] Left 1 Implanted       Tourniquet Time: Was 44 minutes.      Medications: Note that we gave 1 g IV tranexamic acid when the cement was mixed.      Estimated Blood Loss: 0     Complications: None.      Quality Measures: I have documented the patient's current medications including dosage, frequency, and route of administration in medical record today. Conservative alternatives were discussed  with the patient as part of the decision-making process prior to the procedure. The patient was evaluated immediately preoperatively for cardiovascular and thromboembolic risk factors. There were no acute risk factors.  Prophylactic IV antibiotics were administered before the tourniquet went up.      Description of Procedure: After prep and drape, Left knee was approached via midline longitudinal anterior incision. Medial parapatellar arthrotomy was extended to the vastus medialis obliquus which was split in line with the fibers near the medial border, in keeping with minimally invasive technique. Patella was osteotomized proper depth for the patella implant. Edroy holes are created. Patella was subluxed and protected. Intramedullary instrumentation was used on each side of the joint; careful and thorough canal content irrigation. I cut the femur 10 mm depth, 5° valgus controlling rotation. The femur  was sized appropriatelyt. Anterior, posterior, and chamfer cuts were made. Tibia is cut 10 mm depth, 5° of posterior slope. Meniscectomies are completed. Trial reduction is performed. Rotation is marked and keel slot was created.      Bony surface was thoroughly cleaned and dried. I injected the posterior capsule and medial lateral gutter with Exparel solution containing 20 mL Exparel, 25 mL 0.25% bupivacaine with epinephrine, 20 mL saline. Care was taken to protect popliteal neurovascular structures and the peroneal nerve. I cemented the tibial baseplate,  Femur, and patella.  Trial reduction revealed a 10 mm MC polyethylene insert best balanced stability and range of motion, and is locked in the tibial tray.      Tourniquet was released; hemostasis obtained. Wound was closed in layers with #1 Vicryl on the capsule, 2-0 Vicryl in subcutaneous tissue, and staples in the skin over a 1/8-inch drain. Note that I injected my capsule with my Exparel solution during closure.      Julian Oshea MD  8/14/2017  3:20  PM

## 2017-08-15 LAB
ANION GAP SERPL CALCULATED.3IONS-SCNC: 10.1 MMOL/L
BUN BLD-MCNC: 10 MG/DL (ref 8–23)
BUN/CREAT SERPL: 12.5 (ref 7–25)
CALCIUM SPEC-SCNC: 9 MG/DL (ref 8.6–10.5)
CHLORIDE SERPL-SCNC: 105 MMOL/L (ref 98–107)
CO2 SERPL-SCNC: 27.9 MMOL/L (ref 22–29)
CREAT BLD-MCNC: 0.8 MG/DL (ref 0.57–1)
GFR SERPL CREATININE-BSD FRML MDRD: 70 ML/MIN/1.73
GLUCOSE BLD-MCNC: 104 MG/DL (ref 65–99)
HCT VFR BLD AUTO: 35.4 % (ref 35.6–45.5)
HGB BLD-MCNC: 11.8 G/DL (ref 11.9–15.5)
POTASSIUM BLD-SCNC: 4.2 MMOL/L (ref 3.5–5.2)
SODIUM BLD-SCNC: 143 MMOL/L (ref 136–145)

## 2017-08-15 PROCEDURE — 85018 HEMOGLOBIN: CPT | Performed by: ORTHOPAEDIC SURGERY

## 2017-08-15 PROCEDURE — 97110 THERAPEUTIC EXERCISES: CPT

## 2017-08-15 PROCEDURE — 97161 PT EVAL LOW COMPLEX 20 MIN: CPT

## 2017-08-15 PROCEDURE — 25010000003 CEFAZOLIN IN DEXTROSE 2-4 GM/100ML-% SOLUTION: Performed by: ORTHOPAEDIC SURGERY

## 2017-08-15 PROCEDURE — 25010000002 KETOROLAC TROMETHAMINE PER 15 MG: Performed by: ORTHOPAEDIC SURGERY

## 2017-08-15 PROCEDURE — 25010000002 FONDAPARINUX PER 0.5 MG: Performed by: ORTHOPAEDIC SURGERY

## 2017-08-15 PROCEDURE — 94799 UNLISTED PULMONARY SVC/PX: CPT

## 2017-08-15 PROCEDURE — 80048 BASIC METABOLIC PNL TOTAL CA: CPT | Performed by: ORTHOPAEDIC SURGERY

## 2017-08-15 PROCEDURE — 85014 HEMATOCRIT: CPT | Performed by: ORTHOPAEDIC SURGERY

## 2017-08-15 PROCEDURE — 97150 GROUP THERAPEUTIC PROCEDURES: CPT

## 2017-08-15 RX ORDER — LOSARTAN POTASSIUM 50 MG/1
100 TABLET ORAL
Status: DISCONTINUED | OUTPATIENT
Start: 2017-08-15 | End: 2017-08-15

## 2017-08-15 RX ORDER — DULOXETIN HYDROCHLORIDE 60 MG/1
60 CAPSULE, DELAYED RELEASE ORAL NIGHTLY
Status: DISCONTINUED | OUTPATIENT
Start: 2017-08-15 | End: 2017-08-19 | Stop reason: HOSPADM

## 2017-08-15 RX ORDER — LOSARTAN POTASSIUM 50 MG/1
100 TABLET ORAL NIGHTLY
Status: DISCONTINUED | OUTPATIENT
Start: 2017-08-15 | End: 2017-08-19 | Stop reason: HOSPADM

## 2017-08-15 RX ORDER — FONDAPARINUX SODIUM 2.5 MG/.5ML
2.5 INJECTION SUBCUTANEOUS ONCE
Status: COMPLETED | OUTPATIENT
Start: 2017-08-15 | End: 2017-08-15

## 2017-08-15 RX ADMIN — METOPROLOL SUCCINATE 100 MG: 100 TABLET, FILM COATED, EXTENDED RELEASE ORAL at 09:56

## 2017-08-15 RX ADMIN — PANTOPRAZOLE SODIUM 40 MG: 40 TABLET, DELAYED RELEASE ORAL at 06:13

## 2017-08-15 RX ADMIN — ATORVASTATIN CALCIUM 20 MG: 20 TABLET, FILM COATED ORAL at 20:13

## 2017-08-15 RX ADMIN — OXYCODONE HYDROCHLORIDE AND ACETAMINOPHEN 2 TABLET: 5; 325 TABLET ORAL at 20:13

## 2017-08-15 RX ADMIN — DULOXETINE HYDROCHLORIDE 60 MG: 60 CAPSULE, DELAYED RELEASE ORAL at 20:13

## 2017-08-15 RX ADMIN — FUROSEMIDE 20 MG: 20 TABLET ORAL at 09:56

## 2017-08-15 RX ADMIN — ASPIRIN 325 MG: 325 TABLET, DELAYED RELEASE ORAL at 09:55

## 2017-08-15 RX ADMIN — CEFAZOLIN SODIUM 2 G: 2 INJECTION, SOLUTION INTRAVENOUS at 06:13

## 2017-08-15 RX ADMIN — OXYCODONE HYDROCHLORIDE AND ACETAMINOPHEN 2 TABLET: 5; 325 TABLET ORAL at 03:25

## 2017-08-15 RX ADMIN — BUDESONIDE AND FORMOTEROL FUMARATE DIHYDRATE 2 PUFF: 80; 4.5 AEROSOL RESPIRATORY (INHALATION) at 20:37

## 2017-08-15 RX ADMIN — OXYCODONE HYDROCHLORIDE AND ACETAMINOPHEN 2 TABLET: 5; 325 TABLET ORAL at 07:18

## 2017-08-15 RX ADMIN — FERROUS SULFATE TAB 325 MG (65 MG ELEMENTAL FE) 325 MG: 325 (65 FE) TAB at 09:55

## 2017-08-15 RX ADMIN — BUDESONIDE AND FORMOTEROL FUMARATE DIHYDRATE 2 PUFF: 80; 4.5 AEROSOL RESPIRATORY (INHALATION) at 07:51

## 2017-08-15 RX ADMIN — OXYCODONE HYDROCHLORIDE AND ACETAMINOPHEN 2 TABLET: 5; 325 TABLET ORAL at 11:45

## 2017-08-15 RX ADMIN — FONDAPARINUX SODIUM 2.5 MG: 2.5 INJECTION, SOLUTION SUBCUTANEOUS at 09:55

## 2017-08-15 RX ADMIN — KETOROLAC TROMETHAMINE 30 MG: 30 INJECTION, SOLUTION INTRAMUSCULAR at 11:45

## 2017-08-15 RX ADMIN — KETOROLAC TROMETHAMINE 30 MG: 30 INJECTION, SOLUTION INTRAMUSCULAR at 06:13

## 2017-08-15 RX ADMIN — TIOTROPIUM BROMIDE 1 CAPSULE: 18 CAPSULE ORAL; RESPIRATORY (INHALATION) at 07:51

## 2017-08-15 RX ADMIN — DOCUSATE SODIUM -SENNOSIDES 2 TABLET: 50; 8.6 TABLET, COATED ORAL at 09:54

## 2017-08-15 RX ADMIN — DOCUSATE SODIUM -SENNOSIDES 2 TABLET: 50; 8.6 TABLET, COATED ORAL at 18:14

## 2017-08-15 RX ADMIN — OXYCODONE HYDROCHLORIDE AND ACETAMINOPHEN 2 TABLET: 5; 325 TABLET ORAL at 15:53

## 2017-08-15 NOTE — PROGRESS NOTES
"/69 (BP Location: Right arm, Patient Position: Lying)  Pulse 77  Temp 99.1 °F (37.3 °C) (Oral)   Resp 16  Ht 63\" (160 cm)  Wt 184 lb 14.4 oz (83.9 kg)  SpO2 93%  BMI 32.75 kg/m2    Lab Results (last 24 hours)     Procedure Component Value Units Date/Time    Hemoglobin & Hematocrit, Blood [[id number]]  (Abnormal) Collected:  08/15/17 0516    Specimen:  Blood Updated:  08/15/17 0546     Hemoglobin 11.8 (L) g/dL      Hematocrit 35.4 (L) %     Basic Metabolic Panel [[id number]]  (Abnormal) Collected:  08/15/17 0516    Specimen:  Blood Updated:  08/15/17 0601     Glucose 104 (H) mg/dL      BUN 10 mg/dL      Creatinine 0.80 mg/dL      Sodium 143 mmol/L      Potassium 4.2 mmol/L      Chloride 105 mmol/L      CO2 27.9 mmol/L      Calcium 9.0 mg/dL      eGFR Non African Amer 70 mL/min/1.73      BUN/Creatinine Ratio 12.5     Anion Gap 10.1 mmol/L     Narrative:       The MDRD GFR formula is only valid for adults with stable renal function between ages 18 and 70.          Imaging Results (last 24 hours)     Procedure Component Value Units Date/Time    XR Knee 1 or 2 View Left [[id number]] Collected:  08/14/17 1607     Updated:  08/14/17 1610    Narrative:       2 VIEWS OF THE LEFT KNEE     HISTORY: Postoperative knee pain     FINDINGS:  1. Satisfactory appearance following total knee arthroplasty, no  evidence of a complication.     This report was finalized on 8/14/2017 4:07 PM by Dr. Deuce Oliveira MD.             Patient Care Team:  Joyce Mark MD as PCP - General (Family Medicine)  Christopher Wells MD as Consulting Physician (Cardiology)  Jannette De La Cruz MD as Consulting Physician (Pulmonary Disease)    SUBJECTIVE  comfortable  PHYSICAL EXAM  NV intact   Active Problems:    Osteoarthritis, knee  History of superficial (not deep) DVT with childbirth    PLAN / DISPOSITION:  Arixtra one time dose  Lives alone - Rehab discharge Thursday  Julian Oshea MD  08/15/17  6:57 AM      "

## 2017-08-15 NOTE — THERAPY TREATMENT NOTE
Acute Care - Physical Therapy Treatment Note  [facility]     Patient Name: Marylu Lunsford  : 1945  MRN: [id number]  Today's Date: 8/15/2017  Onset of Illness/Injury or Date of Surgery Date: 17  Date of Referral to PT: 17  Referring Physician: Julian Brown    Admit Date: 2017    Visit Dx:  No diagnosis found.  Patient Active Problem List   Diagnosis   • Severe sepsis   • Atrial fibrillation   • COPD (chronic obstructive pulmonary disease)   • Venous insufficiency (chronic) (peripheral)   • Pneumonia of right upper lobe due to infectious organism   • Syncope   • Abnormal TSH   • DNR (do not resuscitate)   • Coronavirus infection   • Osteoarthritis, knee               Adult Rehabilitation Note       08/15/17 1300          Rehab Assessment/Intervention    Discipline physical therapy assistant  -RW      Document Type therapy note (daily note)  -RW      Subjective Information agree to therapy  -RW      Patient Effort, Rehab Treatment good  -RW      Precautions/Limitations fall precautions  -RW      Recorded by [RW] Maria Elena Diego PTA      Pain Assessment    Pain Assessment 0-10  -RW      Pain Score 7  -RW      Pain Type Acute pain;Surgical pain  -RW      Pain Location Knee  -RW      Pain Orientation Left  -RW      Pain Intervention(s) Medication (See MAR);Repositioned;Ambulation/increased activity  -RW      Response to Interventions tolerated  -RW      Recorded by [RW] Maria Elena Diego PTA      Cognitive Assessment/Intervention    Current Cognitive/Communication Assessment functional  -RW      Orientation Status oriented x 4  -RW      Follows Commands/Answers Questions 100% of the time  -RW      Personal Safety WNL/WFL  -RW      Personal Safety Interventions fall prevention program maintained;gait belt;nonskid shoes/slippers when out of bed  -RW      Recorded by [RW] Maria Elena Diego PTA      ROM (Range of Motion)    General ROM Detail L knee '  -RW      Recorded by [RW] Maria Elena FISHER  TAWANNA Diego      Bed Mobility, Assessment/Treatment    Bed Mob, Supine to Sit, [place] not tested  -RW      Bed Mob, Sit to Supine, [place] not tested  -RW      Bed Mobility, Comment Pt up in chair  -RW      Recorded by [RW] Maria Elena Diego PTA      Transfer Assessment/Treatment    Transfers, Sit-Stand [place] contact guard assist;verbal cues required  -RW      Transfers, Stand-Sit [place] contact guard assist;verbal cues required  -RW      Transfers, Sit-Stand-Sit, Assist Device rolling walker  -RW      Recorded by [RW] Maria Elena Diego PTA      Gait Assessment/Treatment    Gait, [place] Level contact guard assist;verbal cues required  -RW      Gait, Assistive Device rolling walker  -RW      Gait, Distance (Feet) 120  -RW      Gait, Gait Pattern Analysis swing-through gait  -RW      Gait, Gait Deviations festinating;left:;antalgic;leeanna decreased;step length decreased  -RW      Recorded by [RW] Maria Elena Diego PTA      Therapy Exercises    Exercise Protocols total knee  -RW      Total Knee Exercises left:;15 reps;completed protocol  -RW      Recorded by [RW] Maria Elena Diego PTA      Positioning and Restraints    Pre-Treatment Position sitting in chair/recliner  -RW      Post Treatment Position chair  -RW      In Chair reclined;call light within reach;encouraged to call for assist  -RW      Recorded by [RW] Maria Elena Diego PTA        User Key  (r) = Recorded By, (t) = Taken By, (c) = Cosigned By    Initials Name Effective Dates     Maria Elena Diego PTA 04/06/16 -                 IP PT Goals       08/15/17 0858          Bed Mobility PT LTG    Bed Mobility PT LTG, Date Established 08/15/17  -MS      Bed Mobility PT LTG, Time to Achieve 3 days  -MS      Bed Mobility PT LTG, Activity Type all bed mobility  -MS      Bed Mobility PT LTG, [place] Level supervision required  -MS      Transfer Training PT LTG    Transfer Training PT LTG, Date Established 08/15/17  -MS      Transfer  Training PT LTG, Time to Achieve 3 days  -MS      Transfer Training PT LTG, Activity Type all transfers  -MS      Transfer Training PT LTG, [place] Level supervision required  -MS      Transfer Training PT LTG, Assist Device walker, rolling  -MS      Gait Training PT LTG    Gait Training Goal PT LTG, Date Established 08/15/17  -MS      Gait Training Goal PT LTG, Time to Achieve 3 days  -MS      Gait Training Goal PT LTG, [place] Level supervision required  -MS      Gait Training Goal PT LTG, Assist Device walker, rolling  -MS      Gait Training Goal PT LTG, Distance to Achieve 100 feet  -MS      Range of Motion PT LTG    Range of Motion Goal PT LTG, Date Established 08/15/17  -MS      Range of Motion Goal PT LTG, Time to Achieve 3 days  -MS      Range fo Motion Goal PT LTG, Joint L knee  -MS      Range of Motion Goal PT LTG, AROM Measure (5, 85)  -MS        User Key  (r) = Recorded By, (t) = Taken By, (c) = Cosigned By    Initials Name Provider Type    MS [place] NATALIA Chase, PT Physical Therapist          Physical Therapy Education     Title: PT OT SLP Therapies (Done)     Topic: Physical Therapy (Done)     Point: Mobility training (Done)    Learning Progress Summary    Learner Readiness Method Response Comment Documented by Status   Patient Acceptance YARELIS NOBLE NR  MS 08/15/17 0857 Done               Point: Home exercise program (Done)    Learning Progress Summary    Learner Readiness Method Response Comment Documented by Status   Patient Acceptance YARELIS NOBLE NR  MS 08/15/17 0857 Done               Point: Body mechanics (Done)    Learning Progress Summary    Learner Readiness Method Response Comment Documented by Status   Patient Acceptance YARELIS NOBLE NR  MS 08/15/17 0857 Done               Point: Precautions (Done)    Learning Progress Summary    Learner Readiness Method Response Comment Documented by Status   Patient Acceptance YAREILS NOBLE NR  MS 08/15/17 0857 Done                      User Key     Initials Effective  Dates Name Provider Type Discipline    MS 12/01/15 -  Fransisco Chase PT Physical Therapist PT                    PT Recommendation and Plan  Anticipated Discharge Disposition: skilled nursing facility  Planned Therapy Interventions: balance training, bed mobility training, gait training, home exercise program, patient/family education, postural re-education, ROM (Range of Motion), strengthening, transfer training  PT Frequency: 2 times/day             Outcome Measures       08/15/17 0800          How much help from another person do you currently need...    Turning from your back to your side while in flat bed without using bedrails? 4  -MS      Moving from lying on back to sitting on the side of a flat bed without bedrails? 3  -MS      Moving to and from a bed to a chair (including a wheelchair)? 3  -MS      Standing up from a chair using your arms (e.g., wheelchair, bedside chair)? 3  -MS      Climbing 3-5 steps with a railing? 3  -MS      To walk in hospital room? 3  -MS      AM-PAC 6 Clicks Score 19  -MS      Functional Assessment    Outcome Measure Options AM-PAC 6 Clicks Basic Mobility (PT)  -MS        User Key  (r) = Recorded By, (t) = Taken By, (c) = Cosigned By    Initials Name Provider Type    MS Fransisco FISHER Rena, PT Physical Therapist           Time Calculation:         PT Charges       08/15/17 1310 08/15/17 0859       Time Calculation    Start Time 1309  -RW 0836  -MS     Stop Time 1415  -RW 0854  -MS     Time Calculation (min) 66 min  -RW 18 min  -MS     PT Received On 08/15/17  -RW 08/15/17  -MS     PT - Next Appointment 08/16/17  - 08/15/17  -MS     PT Goal Re-Cert Due Date  08/18/17  -MS       User Key  (r) = Recorded By, (t) = Taken By, (c) = Cosigned By    Initials Name Provider Type    MS Fransisco L Rena, PT Physical Therapist    VIVIANE Diego, PTA Physical Therapy Assistant          Therapy Charges for Today     Code Description Service Date Service Provider Modifiers Qty     62468609800  PT THER PROC EA 15 MIN 8/15/2017 Maria Elena Diego, PTA GP 1    48848609449 HC PT THER PROC GROUP 8/15/2017 Maria Elena Diego PTA GP 1          PT G-Codes  Outcome Measure Options: AM-PAC 6 Clicks Basic Mobility (PT)    Maria Elena Diego PTA  8/15/2017

## 2017-08-15 NOTE — THERAPY EVALUATION
Acute Care - Physical Therapy Initial Evaluation  [facility]     Patient Name: Marylu Lunsford  : 1945  MRN: [id number]  Today's Date: 8/15/2017   Onset of Illness/Injury or Date of Surgery Date: 17  Date of Referral to PT: 17  Referring Physician: Julian Brown      Admit Date: 2017     Visit Dx:  No diagnosis found.  Patient Active Problem List   Diagnosis   • Severe sepsis   • Atrial fibrillation   • COPD (chronic obstructive pulmonary disease)   • Venous insufficiency (chronic) (peripheral)   • Pneumonia of right upper lobe due to infectious organism   • Syncope   • Abnormal TSH   • DNR (do not resuscitate)   • Coronavirus infection   • Osteoarthritis, knee     Past Medical History:   Diagnosis Date   • Anxiety    • Arthritis     OSTEO   • Chronic back pain    • COPD (chronic obstructive pulmonary disease)    • Depression    • History of cellulitis     S/P RTHR-(INCISION)   • History of sepsis 2017    R/T BACTERIAL PNEUMONIA   • History of transfusion    • Hyperlipidemia    • Hypertension    • Insomnia    • Lower back pain    • Pneumonia, bacterial 2017   • Presence of partial dental prosthetic device     LOWER   • Sleep apnea     CPAP   • Venous insufficiency (chronic) (peripheral) 2016     Past Surgical History:   Procedure Laterality Date   • CRANIOTOMY      benign cyst on brainstem   • HIP ARTHROPLASTY Left    • STAPEDECTOMY     • TEETH EXTRACTION      PARTIAL PLATE LOWER   • TOTAL HIP ARTHROPLASTY Right    • TOTAL HIP ARTHROPLASTY REVISION Right    • TUBAL ABDOMINAL LIGATION     • VEIN LIGATION AND STRIPPING Bilateral           PT ASSESSMENT (last 72 hours)      PT Evaluation       08/15/17 0853 17 1213    Rehab Evaluation    Document Type evaluation  -MS     Subjective Information agree to therapy;complains of;fatigue;pain  -MS     Patient Effort, Rehab Treatment good  -MS     Symptoms Noted Comment Pt. reports pain/soreness in her Left knee as well as overall  fatigue this AM.  -MS     General Information    Onset of Illness/Injury or Date of Surgery Date 08/14/17  -MS     Referring Physician Julian Brown  -MS     Pertinent History Of Current Problem Pt. s/p Left TKR  -MS     Precautions/Limitations fall precautions  -MS     Prior Level of Function independent:  -MS     Equipment Currently Used at Home none  -MS none  -[facility]    Plans/Goals Discussed With patient;agreed upon  -MS     Risks Reviewed patient:  -MS     Benefits Reviewed patient:  -MS     Barriers to Rehab none identified  -MS     Living Environment    Lives With  alone  -GH    Living Arrangements  house  -GH    Home Accessibility  no concerns  -GH    Stair Railings at Home  none  -GH    Type of Financial/Environmental Concern  none  -GH    Transportation Available  car;family or friend will provide  -GH    Clinical Impression    Date of Referral to PT 08/14/17  -MS     Criteria for Skilled Therapeutic Interventions Met treatment indicated  -MS     Rehab Potential good, to achieve stated therapy goals  -MS     Pain Assessment    Pain Assessment 0-10  -MS     Pain Score 3  -MS     Post Pain Score 3  -MS     Pain Type Acute pain;Surgical pain  -MS     Pain Location Knee  -MS     Pain Orientation Left  -MS     Cognitive Assessment/Intervention    Current Cognitive/Communication Assessment functional  -MS     Orientation Status oriented x 4  -MS     Follows Commands/Answers Questions 100% of the time  -MS     Personal Safety WNL/WFL  -MS     Personal Safety Interventions fall prevention program maintained;gait belt;nonskid shoes/slippers when out of bed;supervised activity  -MS     ROM (Range of Motion)    General ROM --   BUE/RLE (WFL's)  -MS     MMT (Manual Muscle Testing)    General MMT Assessment --   BUE/RLE (4-/5)  -MS     Mobility Assessment/Training    Extremity Weight-Bearing Status left lower extremity  -MS     Left Lower Extremity Weight-Bearing weight-bearing as tolerated  -MS     Bed Mobility,  Assessment/Treatment    Bed Mob, Supine to Sit, [place] contact guard assist  -MS     Transfer Assessment/Treatment    Transfers, Sit-Stand [place] minimum assist (75% patient effort);2 person assist required  -MS     Transfers, Stand-Sit [place] minimum assist (75% patient effort);2 person assist required  -MS     Transfers, Sit-Stand-Sit, Assist Device rolling walker  -MS     Gait Assessment/Treatment    Gait, [place] Level contact guard assist;2 person assist required  -MS     Gait, Assistive Device rolling walker  -MS     Gait, Distance (Feet) 40  -MS     Gait, Gait Pattern Analysis swing-through gait  -MS     Gait, Gait Deviations left:;antalgic;leeanna decreased;forward flexed posture  -MS     Gait, Comment Pt. requires verbal/tactile cues for posture correction and RWX guidance.  -MS     Therapy Exercises    Exercise Protocols total knee  -MS     Total Knee Exercises left:;10 reps;completed protocol  -MS     Positioning and Restraints    Pre-Treatment Position in bed  -MS     Post Treatment Position chair  -MS     In Chair notified nsg;reclined;sitting;call light within reach;encouraged to call for assist   All lines intact. Ice pack to Left knee.  -MS       User Key  (r) = Recorded By, (t) = Taken By, (c) = Cosigned By    Initials Name Provider Type     Leia Soto, RN Registered Nurse    MS Fransisco Chase, PT Physical Therapist          Physical Therapy Education     Title: PT OT SLP Therapies (Done)     Topic: Physical Therapy (Done)     Point: Mobility training (Done)    Learning Progress Summary    Learner Readiness Method Response Comment Documented by Status   Patient Acceptance YARELIS NOBLE NR  MS 08/15/17 0857 Done               Point: Home exercise program (Done)    Learning Progress Summary    Learner Readiness Method Response Comment Documented by Status   Patient Acceptance YARELIS NOBLE NR  MS 08/15/17 0857 Done               Point: Body mechanics (Done)    Learning Progress  Summary    Learner Readiness Method Response Comment Documented by Status   Patient Acceptance YARELIS NOBLE NR  MS 08/15/17 0857 Done               Point: Precautions (Done)    Learning Progress Summary    Learner Readiness Method Response Comment Documented by Status   Patient Acceptance YARELIS NOBLE NR  MS 08/15/17 0857 Done                      User Key     Initials Effective Dates Name Provider Type Discipline    MS 12/01/15 -  Fransisco Chase, PT Physical Therapist PT                PT Recommendation and Plan  Anticipated Discharge Disposition: skilled nursing facility  Planned Therapy Interventions: balance training, bed mobility training, gait training, home exercise program, patient/family education, postural re-education, ROM (Range of Motion), strengthening, transfer training  PT Frequency: 2 times/day  Plan of Care Review  Plan Of Care Reviewed With: patient  Outcome Summary/Follow up Plan: Pt. will benefit from skilled inpt. P.T. to address her functional deficits and to assist pt. in regaining her independence with functional mobility.          IP PT Goals       08/15/17 0858          Bed Mobility PT LTG    Bed Mobility PT LTG, Date Established 08/15/17  -MS      Bed Mobility PT LTG, Time to Achieve 3 days  -MS      Bed Mobility PT LTG, Activity Type all bed mobility  -MS      Bed Mobility PT LTG, [place] Level supervision required  -MS      Transfer Training PT LTG    Transfer Training PT LTG, Date Established 08/15/17  -MS      Transfer Training PT LTG, Time to Achieve 3 days  -MS      Transfer Training PT LTG, Activity Type all transfers  -MS      Transfer Training PT LTG, [place] Level supervision required  -MS      Transfer Training PT LTG, Assist Device walker, rolling  -MS      Gait Training PT LTG    Gait Training Goal PT LTG, Date Established 08/15/17  -MS      Gait Training Goal PT LTG, Time to Achieve 3 days  -MS      Gait Training Goal PT LTG, [place] Level supervision required  -MS       Gait Training Goal PT LTG, Assist Device walker, rolling  -MS      Gait Training Goal PT LTG, Distance to Achieve 100 feet  -MS      Range of Motion PT LTG    Range of Motion Goal PT LTG, Date Established 08/15/17  -MS      Range of Motion Goal PT LTG, Time to Achieve 3 days  -MS      Range fo Motion Goal PT LTG, Joint L knee  -MS      Range of Motion Goal PT LTG, AROM Measure (5, 85)  -MS        User Key  (r) = Recorded By, (t) = Taken By, (c) = Cosigned By    Initials Name Provider Type    MS Fransisco FISHER Rena PT Physical Therapist                Outcome Measures       08/15/17 0800          How much help from another person do you currently need...    Turning from your back to your side while in flat bed without using bedrails? 4  -MS      Moving from lying on back to sitting on the side of a flat bed without bedrails? 3  -MS      Moving to and from a bed to a chair (including a wheelchair)? 3  -MS      Standing up from a chair using your arms (e.g., wheelchair, bedside chair)? 3  -MS      Climbing 3-5 steps with a railing? 3  -MS      To walk in hospital room? 3  -MS      AM-PAC 6 Clicks Score 19  -MS      Functional Assessment    Outcome Measure Options AM-PAC 6 Clicks Basic Mobility (PT)  -MS        User Key  (r) = Recorded By, (t) = Taken By, (c) = Cosigned By    Initials Name Provider Type    MS [place] NATALIA Chase PT Physical Therapist           Time Calculation:         PT Charges       08/15/17 0859          Time Calculation    Start Time 0836  -MS      Stop Time 0854  -MS      Time Calculation (min) 18 min  -MS      PT Received On 08/15/17  -MS      PT - Next Appointment 08/15/17  -MS      PT Goal Re-Cert Due Date 08/18/17  -MS        User Key  (r) = Recorded By, (t) = Taken By, (c) = Cosigned By    Initials Name Provider Type    MS Fransisco Chase PT Physical Therapist          Therapy Charges for Today     Code Description Service Date Service Provider Modifiers Qty    50138098439  PT EVAL LOW  COMPLEXITY 1 8/15/2017 Fransisco Chase, PT GP 1    94852175717 HC PT THER PROC EA 15 MIN 8/15/2017 Fransisco Chase, PT GP 1    42408504888 HC PT THER SUPP EA 15 MIN 8/15/2017 Fransisco Chase, PT GP 1          PT G-Codes  Outcome Measure Options: AM-PAC 6 Clicks Basic Mobility (PT)      Fransisco Chase, PT  8/15/2017

## 2017-08-15 NOTE — DISCHARGE PLACEMENT REQUEST
"Chaz Jimenez (72 y.o. Female)     Date of Birth Social Security Number Address Home Phone MRN    1945  [id number] [facility] [address] [phone] [id number]    Shinto Marital Status          Yarsani        Admission Date Admission Type Admitting Provider Attending Provider Department, Room/Bed    8/14/17 Elective Julian Oshea MD Sweet, Richard A, MD [address], P781/1    Discharge Date Discharge Disposition Discharge Destination                      Attending Provider: Julian Oshea MD     Allergies:  Doxycycline, Felodipine, Nsaids    Isolation:  None   Infection:  None   Code Status:  FULL    Ht:  63\" (160 cm)   Wt:  184 lb 14.4 oz (83.9 kg)    Admission Cmt:  None   Principal Problem:  None                Active Insurance as of 8/14/2017     Primary Coverage     Payor Plan Insurance Group Employer/Plan Group    MEDICARE MEDICARE A & B      Payor Plan Address Payor Plan Phone Number Effective From Effective To    [address] [phone] 6/1/2009     [place] [address]       Subscriber Name Subscriber Birth Date Member ID       CHAZ JIMENEZ 1945 [id number]           Secondary Coverage     Payor Plan Insurance Group Employer/Plan Group    [employer] [place] [employer] [place]      Payor Plan Address Payor Plan Phone Number Effective From Effective To    [employer] [place] [address] [phone] 1/1/2014     [place] [address]       Subscriber Name Subscriber Birth Date Member ID       CHAZ JIMENEZ 1945 [id number]                 Emergency Contacts      (Rel.) Home Phone Work Phone Mobile Phone    Dereje Powell (Son) -- -- [phone]    Igor Powell (Son) -- -- [phone]    KimberlynMarianna (Daughter) [phone] -- [phone]    Sunitha Powell (Relative) -- -- [phone]              "

## 2017-08-15 NOTE — PLAN OF CARE
Problem: Patient Care Overview (Adult)  Goal: Plan of Care Review    08/15/17 0858   Coping/Psychosocial Response Interventions   Plan Of Care Reviewed With patient   Outcome Evaluation   Outcome Summary/Follow up Plan Pt. will benefit from skilled inpt. P.T. to address her functional deficits and to assist pt. in regaining her independence with functional mobility.         Problem: Inpatient Physical Therapy  Goal: Bed Mobility Goal LTG- PT    08/15/17 0858   Bed Mobility PT LTG   Bed Mobility PT LTG, Date Established 08/15/17   Bed Mobility PT LTG, Time to Achieve 3 days   Bed Mobility PT LTG, Activity Type all bed mobility   Bed Mobility PT LTG, [place] Level supervision required       Goal: Transfer Training Goal 1 LTG- PT    08/15/17 0858   Transfer Training PT LTG   Transfer Training PT LTG, Date Established 08/15/17   Transfer Training PT LTG, Time to Achieve 3 days   Transfer Training PT LTG, Activity Type all transfers   Transfer Training PT LTG, [place] Level supervision required   Transfer Training PT LTG, Assist Device walker, rolling       Goal: Gait Training Goal LTG- PT    08/15/17 0858   Gait Training PT LTG   Gait Training Goal PT LTG, Date Established 08/15/17   Gait Training Goal PT LTG, Time to Achieve 3 days   Gait Training Goal PT LTG, [place] Level supervision required   Gait Training Goal PT LTG, Assist Device walker, rolling   Gait Training Goal PT LTG, Distance to Achieve 100 feet       Goal: Range of Motion Goal LTG- PT    08/15/17 0858   Range of Motion PT LTG   Range of Motion Goal PT LTG, Date Established 08/15/17   Range of Motion Goal PT LTG, Time to Achieve 3 days   Range fo Motion Goal PT LTG, Joint L knee   Range of Motion Goal PT LTG, AROM Measure (5, 85)

## 2017-08-15 NOTE — PROGRESS NOTES
Discharge Planning Assessment  [facility]     Patient Name: Marylu Lunsford  MRN: [id number]  Today's Date: 8/15/2017    Admit Date: 8/14/2017          Discharge Needs Assessment       08/15/17 0918    Discharge Needs Assessment    Equipment Currently Used at Home cane, straight      08/15/17 0916    Living Environment    Lives With alone    Living Arrangements house    Home Accessibility no concerns    Type of Financial/Environmental Concern none    Transportation Available car;family or friend will provide    Living Environment    Quality Of Family Relationships involved    Discharge Needs Assessment    Readmission Within The Last 30 Days no previous admission in last 30 days    Equipment Currently Used at Home cane, straight    Discharge Disposition skilled nursing facility;home healthcare service            Discharge Plan       08/15/17 0919    Case Management/Social Work Plan    Plan [facility]    Patient/Family In Agreement With Plan yes    Additional Comments Facesheet and d/c plan verified, pt lives alone and is pre-registered / [facility]. Marcia/Leonel notified of admission and will check bed availability, planning for d/c to [facility] on Thursday 8/17.         Discharge Placement     Facility/Agency Request Status Selected? Address Phone Number Fax Number    [facility] Pending - Request Sent     [address] [address] [phone]        Kenna Sauer RN 8/15/2017 09:21    8/15/2017  In basket to Marcia.                            Demographic Summary     None            Functional Status       08/15/17 0919    Functional Status Prior    Ambulation 1-->assistive equipment    Transferring 0-->independent    Toileting 0-->independent    Bathing 0-->independent    Dressing 0-->independent    Eating 0-->independent    Communication 0-->understands/communicates without difficulty    Swallowing 0-->swallows foods/liquids without difficulty     IADL    Medications independent    Meal Preparation independent    Housekeeping independent    Laundry independent    Shopping assistive equipment    Oral Care independent    Activity Tolerance    Usual Activity Tolerance moderate    Current Activity Tolerance fair      08/15/17 0917    Functional Status Current    Ambulation 3-->assistive equipment and person    Transferring 3-->assistive equipment and person    Toileting 3-->assistive equipment and person    Bathing 2-->assistive person    Dressing 2-->assistive person    Eating 0-->independent    Communication 0-->understands/communicates without difficulty    Swallowing (if score 2 or more for any item, consult Rehab Services) 0-->swallows foods/liquids without difficulty    Change in Functional Status Since Onset of Current Illness/Injury yes            Psychosocial     None            Abuse/Neglect     None            Legal     None            Substance Abuse     None            Patient Forms       08/15/17 0919    Patient Forms    Provider Choice List Delivered    Delivered to Patient    Method of delivery Telephone          Kenna Sauer RN

## 2017-08-16 PROCEDURE — 97150 GROUP THERAPEUTIC PROCEDURES: CPT

## 2017-08-16 PROCEDURE — 94799 UNLISTED PULMONARY SVC/PX: CPT

## 2017-08-16 PROCEDURE — 97110 THERAPEUTIC EXERCISES: CPT

## 2017-08-16 PROCEDURE — 93005 ELECTROCARDIOGRAM TRACING: CPT | Performed by: ORTHOPAEDIC SURGERY

## 2017-08-16 PROCEDURE — 93010 ELECTROCARDIOGRAM REPORT: CPT | Performed by: INTERNAL MEDICINE

## 2017-08-16 PROCEDURE — 99222 1ST HOSP IP/OBS MODERATE 55: CPT | Performed by: INTERNAL MEDICINE

## 2017-08-16 RX ORDER — HYDROCODONE BITARTRATE AND ACETAMINOPHEN 7.5; 325 MG/1; MG/1
2 TABLET ORAL EVERY 6 HOURS PRN
Status: DISCONTINUED | OUTPATIENT
Start: 2017-08-16 | End: 2017-08-16

## 2017-08-16 RX ORDER — CLONAZEPAM 0.5 MG/1
0.75 TABLET ORAL EVERY 12 HOURS SCHEDULED
Status: DISCONTINUED | OUTPATIENT
Start: 2017-08-16 | End: 2017-08-19 | Stop reason: HOSPADM

## 2017-08-16 RX ORDER — HYDROCODONE BITARTRATE AND ACETAMINOPHEN 7.5; 325 MG/1; MG/1
2 TABLET ORAL EVERY 4 HOURS PRN
Status: DISCONTINUED | OUTPATIENT
Start: 2017-08-16 | End: 2017-08-19 | Stop reason: HOSPADM

## 2017-08-16 RX ORDER — HYDROCODONE BITARTRATE AND ACETAMINOPHEN 7.5; 325 MG/1; MG/1
1 TABLET ORAL EVERY 4 HOURS PRN
Status: DISCONTINUED | OUTPATIENT
Start: 2017-08-16 | End: 2017-08-19 | Stop reason: HOSPADM

## 2017-08-16 RX ORDER — HYDROCODONE BITARTRATE AND ACETAMINOPHEN 7.5; 325 MG/1; MG/1
1 TABLET ORAL EVERY 6 HOURS PRN
Status: DISCONTINUED | OUTPATIENT
Start: 2017-08-16 | End: 2017-08-16 | Stop reason: SDUPTHER

## 2017-08-16 RX ORDER — HYDROCODONE BITARTRATE AND ACETAMINOPHEN 7.5; 325 MG/1; MG/1
1 TABLET ORAL EVERY 6 HOURS PRN
Status: DISCONTINUED | OUTPATIENT
Start: 2017-08-16 | End: 2017-08-16

## 2017-08-16 RX ADMIN — BUDESONIDE AND FORMOTEROL FUMARATE DIHYDRATE 2 PUFF: 80; 4.5 AEROSOL RESPIRATORY (INHALATION) at 07:48

## 2017-08-16 RX ADMIN — HYDROCODONE BITARTRATE AND ACETAMINOPHEN 2 TABLET: 7.5; 325 TABLET ORAL at 09:28

## 2017-08-16 RX ADMIN — OXYCODONE HYDROCHLORIDE AND ACETAMINOPHEN 2 TABLET: 5; 325 TABLET ORAL at 00:12

## 2017-08-16 RX ADMIN — CLONAZEPAM 0.75 MG: 0.5 TABLET ORAL at 09:20

## 2017-08-16 RX ADMIN — BUDESONIDE AND FORMOTEROL FUMARATE DIHYDRATE 2 PUFF: 80; 4.5 AEROSOL RESPIRATORY (INHALATION) at 21:12

## 2017-08-16 RX ADMIN — PANTOPRAZOLE SODIUM 40 MG: 40 TABLET, DELAYED RELEASE ORAL at 06:10

## 2017-08-16 RX ADMIN — DOCUSATE SODIUM -SENNOSIDES 2 TABLET: 50; 8.6 TABLET, COATED ORAL at 17:53

## 2017-08-16 RX ADMIN — HYDROCODONE BITARTRATE AND ACETAMINOPHEN 2 TABLET: 7.5; 325 TABLET ORAL at 22:10

## 2017-08-16 RX ADMIN — HYDROCODONE BITARTRATE AND ACETAMINOPHEN 2 TABLET: 7.5; 325 TABLET ORAL at 14:08

## 2017-08-16 RX ADMIN — DULOXETINE HYDROCHLORIDE 60 MG: 60 CAPSULE, DELAYED RELEASE ORAL at 20:19

## 2017-08-16 RX ADMIN — HYDROCODONE BITARTRATE AND ACETAMINOPHEN 2 TABLET: 7.5; 325 TABLET ORAL at 17:53

## 2017-08-16 RX ADMIN — ATORVASTATIN CALCIUM 20 MG: 20 TABLET, FILM COATED ORAL at 20:19

## 2017-08-16 RX ADMIN — DOCUSATE SODIUM -SENNOSIDES 2 TABLET: 50; 8.6 TABLET, COATED ORAL at 09:21

## 2017-08-16 RX ADMIN — FUROSEMIDE 20 MG: 20 TABLET ORAL at 09:22

## 2017-08-16 RX ADMIN — CLONAZEPAM 0.75 MG: 0.5 TABLET ORAL at 20:19

## 2017-08-16 RX ADMIN — CLONAZEPAM 0.5 MG: 0.5 TABLET ORAL at 00:23

## 2017-08-16 RX ADMIN — METOPROLOL SUCCINATE 100 MG: 100 TABLET, FILM COATED, EXTENDED RELEASE ORAL at 09:22

## 2017-08-16 RX ADMIN — OXYCODONE HYDROCHLORIDE AND ACETAMINOPHEN 2 TABLET: 5; 325 TABLET ORAL at 04:05

## 2017-08-16 RX ADMIN — TIOTROPIUM BROMIDE 1 CAPSULE: 18 CAPSULE ORAL; RESPIRATORY (INHALATION) at 07:47

## 2017-08-16 RX ADMIN — FERROUS SULFATE TAB 325 MG (65 MG ELEMENTAL FE) 325 MG: 325 (65 FE) TAB at 09:29

## 2017-08-16 RX ADMIN — ASPIRIN 325 MG: 325 TABLET, DELAYED RELEASE ORAL at 09:22

## 2017-08-16 NOTE — PLAN OF CARE
Problem: Patient Care Overview (Adult)  Goal: Plan of Care Review  Outcome: Ongoing (interventions implemented as appropriate)    08/16/17 7265   Coping/Psychosocial Response Interventions   Plan Of Care Reviewed With patient   Patient Care Overview   Progress progress toward functional goals as expected   Outcome Evaluation   Outcome Summary/Follow up Plan PATIENT CONTINUES WITH NUMEROUS CUES NEEDED FOR ADLS. UNCOMPLIANT WITH AMBULATION AND WANTS TO DO IT INDEPENDANTLY. PAIN BETTER CONTROLLED WITH MED CHANGE. CARDIOLOGY CONSULTED FOR AFIB. NO NEW ORDERS. B/P WELL CONTROLLED WITH ORDERED MEDS AND PT EDUCATED REGARDING AFIB AND HTN.       Goal: Adult Individualization and Mutuality  Outcome: Ongoing (interventions implemented as appropriate)  Goal: Discharge Needs Assessment  Outcome: Ongoing (interventions implemented as appropriate)    Problem: Fall Risk (Adult)  Goal: Absence of Falls  Outcome: Ongoing (interventions implemented as appropriate)    Problem: Knee Replacement, Total (Adult)  Goal: Signs and Symptoms of Listed Potential Problems Will be Absent or Manageable (Knee Replacement, Total)  Outcome: Ongoing (interventions implemented as appropriate)

## 2017-08-16 NOTE — PROGRESS NOTES
"/77 (BP Location: Left arm, Patient Position: Lying)  Pulse 80  Temp 98.4 °F (36.9 °C) (Oral)   Resp 18  Ht 63\" (160 cm)  Wt 184 lb 14.4 oz (83.9 kg)  SpO2 92%  BMI 32.75 kg/m2      Results from last 7 days  Lab Units 08/15/17  0516   HEMOGLOBIN g/dL 11.8*   HEMATOCRIT % 35.4*         Results from last 7 days  Lab Units 08/15/17  0516   SODIUM mmol/L 143   POTASSIUM mmol/L 4.2   CHLORIDE mmol/L 105   CO2 mmol/L 27.9   BUN mg/dL 10   CREATININE mg/dL 0.80   GLUCOSE mg/dL 104*   CALCIUM mg/dL 9.0       Imaging Results (last 24 hours)     ** No results found for the last 24 hours. **          Patient Care Team:  Joyce Mark MD as PCP - General (Family Medicine)  Christopher Wells MD as Consulting Physician (Cardiology)  Jannette De La Cruz MD as Consulting Physician (Pulmonary Disease)    SUBJECTIVE  C/o pain.  PHYSICAL EXAM  HR is between 130-147  EKG shows A Fib   Active Problems:    Osteoarthritis, knee      PLAN / DISPOSITION:  Consult cardiology  Switch pain meds to Hydrocodone 7.5   JORGE L Hope  08/16/17  8:33 AM    "

## 2017-08-16 NOTE — THERAPY TREATMENT NOTE
Acute Care - Physical Therapy Treatment Note  [facility]     Patient Name: Marylu Lunsford  : 1945  MRN: [id number]  Today's Date: 2017  Onset of Illness/Injury or Date of Surgery Date: 17  Date of Referral to PT: 17  Referring Physician: Julian Brown    Admit Date: 2017    Visit Dx:  No diagnosis found.  Patient Active Problem List   Diagnosis   • Severe sepsis   • Atrial fibrillation   • COPD (chronic obstructive pulmonary disease)   • Venous insufficiency (chronic) (peripheral)   • Pneumonia of right upper lobe due to infectious organism   • Syncope   • Abnormal TSH   • DNR (do not resuscitate)   • Coronavirus infection   • Osteoarthritis, knee               Adult Rehabilitation Note       17 1100 08/15/17 1300       Rehab Assessment/Intervention    Discipline physical therapy assistant  -RW physical therapy assistant  -RW     Document Type therapy note (daily note)  -RW therapy note (daily note)  -RW     Subjective Information agree to therapy;complains of;pain  -RW agree to therapy  -RW     Patient Effort, Rehab Treatment good  -RW good  -RW     Precautions/Limitations fall precautions  -RW fall precautions  -RW     Recorded by [RW] Maria Elena Diego PTA [RW] Maria Elena Diego PTA     Pain Assessment    Pain Assessment 0-10  -RW 0-10  -RW     Pain Score 9  -RW 7  -RW     Pain Type Acute pain;Surgical pain  -RW Acute pain;Surgical pain  -RW     Pain Location Knee  -RW Knee  -RW     Pain Orientation Left  -RW Left  -RW     Pain Intervention(s) Medication (See MAR);Repositioned;Ambulation/increased activity  -RW Medication (See MAR);Repositioned;Ambulation/increased activity  -RW     Response to Interventions tolerated  -RW tolerated  -RW     Recorded by [RW] Maria Elena Diego PTA [RW] Maria Elena Diego PTA     Cognitive Assessment/Intervention    Current Cognitive/Communication Assessment functional  -RW functional  -RW     Orientation Status oriented x 4  -RW oriented x 4   -RW     Follows Commands/Answers Questions 100% of the time  -% of the time  -RW     Personal Safety WNL/WFL  -RW WNL/WFL  -RW     Personal Safety Interventions fall prevention program maintained;gait belt;nonskid shoes/slippers when out of bed  -RW fall prevention program maintained;gait belt;nonskid shoes/slippers when out of bed  -RW     Recorded by [RW] Maria lEena Diego PTA [RW] Maria Elena Diego PTA     ROM (Range of Motion)    General ROM Detail L knee 14-73'  -RW L knee 13-84'  -RW     Recorded by [RW] Maria Elena Diego PTA [RW] Maria Elena Diego PTA     Bed Mobility, Assessment/Treatment    Bed Mob, Supine to Sit, [place] not tested  -RW not tested  -RW     Bed Mob, Sit to Supine, [place] not tested  -RW not tested  -RW     Bed Mobility, Comment Pt up in chair  -RW Pt up in chair  -RW     Recorded by [RW] Maria Elena Diego PTA [RW] Maria Elena Diego PTA     Transfer Assessment/Treatment    Transfers, Sit-Stand [place] contact guard assist;verbal cues required  -RW contact guard assist;verbal cues required  -RW     Transfers, Stand-Sit [place] contact guard assist;verbal cues required  -RW contact guard assist;verbal cues required  -RW     Transfers, Sit-Stand-Sit, Assist Device rolling walker  -RW rolling walker  -RW     Recorded by [RW] Maria Elena Diego PTA [RW] Maria Elena Diego PTA     Gait Assessment/Treatment    Gait, [place] Level contact guard assist;verbal cues required  -RW contact guard assist;verbal cues required  -RW     Gait, Assistive Device rolling walker  -RW rolling walker  -RW     Gait, Distance (Feet) 45  -  -RW     Gait, Gait Pattern Analysis swing-to gait  -RW swing-through gait  -RW     Gait, Gait Deviations forward flexed posture;left:;antalgic;leeanna decreased;step length decreased  -RW festinating;left:;antalgic;leeanna decreased;step length decreased  -RW     Gait, Safety Issues step length decreased  -RW      Gait, Comment distance limited due to  pain and fatigue  -RW      Recorded by [RW] Maria Elena Diego PTA [RW] Maria Elena Diego PTA     Therapy Exercises    Exercise Protocols total knee  -RW total knee  -RW     Total Knee Exercises left:;20 reps;completed protocol;with assist;SLR  -RW left:;15 reps;completed protocol  -RW     Recorded by [RW] Maria Elena Diego PTA [RW] Maria Elena Diego PTA     Positioning and Restraints    Pre-Treatment Position sitting in chair/recliner  -RW sitting in chair/recliner  -RW     Post Treatment Position chair  -RW chair  -RW     In Chair reclined;call light within reach;encouraged to call for assist;exit alarm on  -RW reclined;call light within reach;encouraged to call for assist  -RW     Recorded by [RW] Maria Elena Diego PTA [RW] Maria Elena Diego PTA       User Key  (r) = Recorded By, (t) = Taken By, (c) = Cosigned By    Initials Name Effective Dates     Maria Elena Diego PTA 04/06/16 -                 IP PT Goals       08/15/17 0858          Bed Mobility PT LTG    Bed Mobility PT LTG, Date Established 08/15/17  -MS      Bed Mobility PT LTG, Time to Achieve 3 days  -MS      Bed Mobility PT LTG, Activity Type all bed mobility  -MS      Bed Mobility PT LTG, [place] Level supervision required  -MS      Transfer Training PT LTG    Transfer Training PT LTG, Date Established 08/15/17  -MS      Transfer Training PT LTG, Time to Achieve 3 days  -MS      Transfer Training PT LTG, Activity Type all transfers  -MS      Transfer Training PT LTG, [place] Level supervision required  -MS      Transfer Training PT LTG, Assist Device walker, rolling  -MS      Gait Training PT LTG    Gait Training Goal PT LTG, Date Established 08/15/17  -MS      Gait Training Goal PT LTG, Time to Achieve 3 days  -MS      Gait Training Goal PT LTG, [place] Level supervision required  -MS      Gait Training Goal PT LTG, Assist Device walker, rolling  -MS      Gait Training Goal PT LTG, Distance to Achieve 100 feet  -MS      Range of Motion PT  LTG    Range of Motion Goal PT LTG, Date Established 08/15/17  -MS      Range of Motion Goal PT LTG, Time to Achieve 3 days  -MS      Range fo Motion Goal PT LTG, Joint L knee  -MS      Range of Motion Goal PT LTG, AROM Measure (5, 85)  -MS        User Key  (r) = Recorded By, (t) = Taken By, (c) = Cosigned By    Initials Name Provider Type    MS Fransisco Chase, PT Physical Therapist          Physical Therapy Education     Title: PT OT SLP Therapies (Done)     Topic: Physical Therapy (Done)     Point: Mobility training (Done)    Learning Progress Summary    Learner Readiness Method Response Comment Documented by Status   Patient Acceptance E,TB,D VU,NR   08/16/17 1141 Done    Acceptance E,D VU,NR  MS 08/15/17 0857 Done               Point: Home exercise program (Done)    Learning Progress Summary    Learner Readiness Method Response Comment Documented by Status   Patient Acceptance E,TB,D VU,NR   08/16/17 1141 Done    Acceptance E,D VU,NR  MS 08/15/17 0857 Done               Point: Body mechanics (Done)    Learning Progress Summary    Learner Readiness Method Response Comment Documented by Status   Patient Acceptance E,TB,D VU,NR   08/16/17 1141 Done    Acceptance E,D VU,NR  MS 08/15/17 0857 Done               Point: Precautions (Done)    Learning Progress Summary    Learner Readiness Method Response Comment Documented by Status   Patient Acceptance E,TB,D VU,NR   08/16/17 1141 Done    Acceptance E,D VU,NR  MS 08/15/17 0857 Done                      User Key     Initials Effective Dates Name Provider Type Discipline    MS 12/01/15 -  Fransisco Chase, PT Physical Therapist PT     04/06/16 -  Maria Elena Diego, TAWANNA Physical Therapy Assistant PT                    PT Recommendation and Plan  Anticipated Discharge Disposition: skilled nursing facility  Planned Therapy Interventions: balance training, bed mobility training, gait training, home exercise program, patient/family education, postural re-education,  ROM (Range of Motion), strengthening, transfer training  PT Frequency: 2 times/day  Plan of Care Review  Plan Of Care Reviewed With: patient  Outcome Summary/Follow up Plan: Pt increased exercise tolerance this am despite pain. Ambulation distance limited due to fatigue and pain. Pt w/ increased swelling which limited ROM as well.           Outcome Measures       08/16/17 1100 08/15/17 0800       How much help from another person do you currently need...    Turning from your back to your side while in flat bed without using bedrails? 4  -RW 4  -MS     Moving from lying on back to sitting on the side of a flat bed without bedrails? 3  -RW 3  -MS     Moving to and from a bed to a chair (including a wheelchair)? 3  -RW 3  -MS     Standing up from a chair using your arms (e.g., wheelchair, bedside chair)? 3  -RW 3  -MS     Climbing 3-5 steps with a railing? 3  -RW 3  -MS     To walk in hospital room? 3  -RW 3  -MS     AM-PAC 6 Clicks Score 19  -RW 19  -MS     Functional Assessment    Outcome Measure Options AM-PAC 6 Clicks Basic Mobility (PT)  -RW AM-PAC 6 Clicks Basic Mobility (PT)  -MS       User Key  (r) = Recorded By, (t) = Taken By, (c) = Cosigned By    Initials Name Provider Type    MS Fransisco FISHER Chase, PT Physical Therapist    RW Maria Elena Diego PTA Physical Therapy Assistant           Time Calculation:         PT Charges       08/16/17 1139          Time Calculation    Start Time 1034  -RW      Stop Time 1125  -RW      Time Calculation (min) 51 min  -RW      PT Received On 08/16/17  -RW      PT - Next Appointment 08/16/17  -        User Key  (r) = Recorded By, (t) = Taken By, (c) = Cosigned By    Initials Name Provider Type    RW Maria Elena Diego PTA Physical Therapy Assistant          Therapy Charges for Today     Code Description Service Date Service Provider Modifiers Qty    67991161328 HC PT THER PROC EA 15 MIN 8/15/2017 Maria Elena Diego PTA GP 1    85119011254 HC PT THER PROC GROUP 8/15/2017 Maria Elena FISHER  Johnathon, PTA GP 1    30250695816 HC PT THER PROC EA 15 MIN 8/16/2017 Maria Elena Diego, PTA GP 2    37825781295 HC PT THER PROC GROUP 8/16/2017 Maria Elena Diego, PTA GP 1          PT G-Codes  Outcome Measure Options: AM-PAC 6 Clicks Basic Mobility (PT)    Maria Elena Diego PTA  8/16/2017

## 2017-08-16 NOTE — PLAN OF CARE
Problem: Patient Care Overview (Adult)  Goal: Plan of Care Review  Outcome: Ongoing (interventions implemented as appropriate)    08/16/17 1606   Coping/Psychosocial Response Interventions   Plan Of Care Reviewed With patient   Outcome Evaluation   Outcome Summary/Follow up Plan pt a little more confuse this pm. Verbal cueing required to stay on task w/ exercises and for safety.

## 2017-08-16 NOTE — PLAN OF CARE
Problem: Patient Care Overview (Adult)  Goal: Plan of Care Review  Outcome: Ongoing (interventions implemented as appropriate)    08/16/17 0307   Coping/Psychosocial Response Interventions   Plan Of Care Reviewed With patient   Patient Care Overview   Progress improving   Outcome Evaluation   Outcome Summary/Follow up Plan Pt has done well through the night. Ambulates with walker use and staff assist x1. Non-compliant at times with getting up without assistance, alarms in place for safety. Pain well controlled with Percocets. Educated pt on SA management and proper use of CPAP.        Goal: Adult Individualization and Mutuality  Outcome: Ongoing (interventions implemented as appropriate)  Goal: Discharge Needs Assessment  Outcome: Ongoing (interventions implemented as appropriate)    Problem: Fall Risk (Adult)  Goal: Absence of Falls  Outcome: Ongoing (interventions implemented as appropriate)    08/16/17 0307   Fall Risk (Adult)   Absence of Falls achieves outcome         Problem: Knee Replacement, Total (Adult)  Goal: Signs and Symptoms of Listed Potential Problems Will be Absent or Manageable (Knee Replacement, Total)  Outcome: Ongoing (interventions implemented as appropriate)    08/16/17 0307   Knee Replacement, Total   Problems Assessed (Total Knee Replacement) all   Problems Present (Total Knee Replacement) pain;decreased range of motion

## 2017-08-16 NOTE — THERAPY TREATMENT NOTE
Acute Care - Physical Therapy Treatment Note  [facility]     Patient Name: Marylu Lunsford  : 1945  MRN: [id number]  Today's Date: 2017  Onset of Illness/Injury or Date of Surgery Date: 17  Date of Referral to PT: 17  Referring Physician: Julian Brown    Admit Date: 2017    Visit Dx:  No diagnosis found.  Patient Active Problem List   Diagnosis   • Severe sepsis   • Atrial fibrillation   • COPD (chronic obstructive pulmonary disease)   • Venous insufficiency (chronic) (peripheral)   • Pneumonia of right upper lobe due to infectious organism   • Syncope   • Abnormal TSH   • DNR (do not resuscitate)   • Coronavirus infection   • Osteoarthritis, knee               Adult Rehabilitation Note       17 1600 17 1100 08/15/17 1300    Rehab Assessment/Intervention    Discipline physical therapy assistant  -RW physical therapy assistant  -RW physical therapy assistant  -RW    Document Type therapy note (daily note)  -RW therapy note (daily note)  -RW therapy note (daily note)  -RW    Subjective Information agree to therapy;complains of;pain  -RW agree to therapy;complains of;pain  -RW agree to therapy  -RW    Patient Effort, Rehab Treatment good  -RW good  -RW good  -RW    Precautions/Limitations fall precautions  -RW fall precautions  -RW fall precautions  -RW    Recorded by [RW] Maria Elena Diego PTA [RW] Maria Elena Diego PTA [RW] Maria Elena Diego PTA    Pain Assessment    Pain Assessment 0-10  -RW 0-10  -RW 0-10  -RW    Pain Score 9  -RW 9  -RW 7  -RW    Pain Type Acute pain;Surgical pain  -RW Acute pain;Surgical pain  -RW Acute pain;Surgical pain  -RW    Pain Location Knee  -RW Knee  -RW Knee  -RW    Pain Orientation Left  -RW Left  -RW Left  -RW    Pain Intervention(s) Medication (See MAR);Repositioned;Ambulation/increased activity  -RW Medication (See MAR);Repositioned;Ambulation/increased activity  -RW Medication (See MAR);Repositioned;Ambulation/increased activity  -RW     Response to Interventions tolerated  -RW tolerated  -RW tolerated  -RW    Recorded by [RW] Maria lEena Diego PTA [RW] Maria Elena Diego PTA [RW] Maria Elena Diego PTA    Cognitive Assessment/Intervention    Current Cognitive/Communication Assessment impaired  -RW functional  -RW functional  -RW    Orientation Status oriented x 4  -RW oriented x 4  -RW oriented x 4  -RW    Follows Commands/Answers Questions 100% of the time;needs cueing  -% of the time  -% of the time  -RW    Personal Safety mild impairment  -RW WNL/WFL  -RW WNL/WFL  -RW    Personal Safety Interventions fall prevention program maintained;gait belt;nonskid shoes/slippers when out of bed  -RW fall prevention program maintained;gait belt;nonskid shoes/slippers when out of bed  -RW fall prevention program maintained;gait belt;nonskid shoes/slippers when out of bed  -RW    Recorded by [RW] Maria Elena Diego PTA [RW] Maria Elena Diego PTA [RW] Maria Elena Diego PTA    ROM (Range of Motion)    General ROM Detail  L knee 14-73'  -RW L knee 13-84'  -RW    Recorded by  [RW] Maria Elena Diego PTA [RW] Maria Elena Diego PTA    Bed Mobility, Assessment/Treatment    Bed Mob, Supine to Sit, [place] not tested  -RW not tested  -RW not tested  -RW    Bed Mob, Sit to Supine, [place] not tested  -RW not tested  -RW not tested  -RW    Bed Mobility, Comment Pt up in chair  -RW Pt up in chair  -RW Pt up in chair  -RW    Recorded by [RW] Maria Elena Diego PTA [RW] Maria Elena Diego, TAWANNA [RW] Maria Elena Diego PTA    Transfer Assessment/Treatment    Transfers, Sit-Stand [place] contact guard assist;verbal cues required  -RW contact guard assist;verbal cues required  -RW contact guard assist;verbal cues required  -RW    Transfers, Stand-Sit [place] contact guard assist;verbal cues required  -RW contact guard assist;verbal cues required  -RW contact guard assist;verbal cues required  -RW    Transfers, Sit-Stand-Sit, Assist Device rolling walker  -RW  rolling walker  -RW rolling walker  -RW    Recorded by [RW] Maria Elena Diego PTA [RW] Maria Elena Diego PTA [RW] Maria Elena Diego PTA    Gait Assessment/Treatment    Gait, [place] Level contact guard assist;stand by assist;verbal cues required  -RW contact guard assist;verbal cues required  -RW contact guard assist;verbal cues required  -RW    Gait, Assistive Device rolling walker  -RW rolling walker  -RW rolling walker  -RW    Gait, Distance (Feet) 100  -RW 45  -  -RW    Gait, Gait Pattern Analysis swing-to gait  -RW swing-to gait  -RW swing-through gait  -RW    Gait, Gait Deviations forward flexed posture;left:;antalgic;leeanna decreased;step length decreased  -RW forward flexed posture;left:;antalgic;leeanna decreased;step length decreased  -RW festinating;left:;antalgic;leeanna decreased;step length decreased  -RW    Gait, Safety Issues step length decreased  -RW step length decreased  -RW     Gait, Comment  distance limited due to pain and fatigue  -RW     Recorded by [RW] Maria Elena Diego PTA [RW] Maria Elena Diego PTA [RW] Maria Elena Diego PTA    Therapy Exercises    Exercise Protocols total knee  -RW total knee  -RW total knee  -RW    Total Knee Exercises left:;25 reps;completed protocol;SLR   Verbal cueing required for pt to stay on task  -RW left:;20 reps;completed protocol;with assist;SLR  -RW left:;15 reps;completed protocol  -RW    Recorded by [RW] Maria Elena Diego PTA [RW] Maria Elena Diego PTA [RW] Maria Elena Diego PTA    Positioning and Restraints    Pre-Treatment Position sitting in chair/recliner  -RW sitting in chair/recliner  -RW sitting in chair/recliner  -RW    Post Treatment Position chair  -RW chair  -RW chair  -RW    In Chair reclined;call light within reach;encouraged to call for assist;exit alarm on  -RW reclined;call light within reach;encouraged to call for assist;exit alarm on  -RW reclined;call light within reach;encouraged to call for assist  -RW    Recorded by [RW] Maria Elena FISHER  Johnathon, PTA [RW] Maria Elena Diego, PTA [RW] Maria Elena Diego, PTA      User Key  (r) = Recorded By, (t) = Taken By, (c) = Cosigned By    Initials Name Effective Dates    RW Maria Elena Diego, PTA 04/06/16 -                 IP PT Goals       08/15/17 0858          Bed Mobility PT LTG    Bed Mobility PT LTG, Date Established 08/15/17  -MS      Bed Mobility PT LTG, Time to Achieve 3 days  -MS      Bed Mobility PT LTG, Activity Type all bed mobility  -MS      Bed Mobility PT LTG, [place] Level supervision required  -MS      Transfer Training PT LTG    Transfer Training PT LTG, Date Established 08/15/17  -MS      Transfer Training PT LTG, Time to Achieve 3 days  -MS      Transfer Training PT LTG, Activity Type all transfers  -MS      Transfer Training PT LTG, [place] Level supervision required  -MS      Transfer Training PT LTG, Assist Device walker, rolling  -MS      Gait Training PT LTG    Gait Training Goal PT LTG, Date Established 08/15/17  -MS      Gait Training Goal PT LTG, Time to Achieve 3 days  -MS      Gait Training Goal PT LTG, [place] Level supervision required  -MS      Gait Training Goal PT LTG, Assist Device walker, rolling  -MS      Gait Training Goal PT LTG, Distance to Achieve 100 feet  -MS      Range of Motion PT LTG    Range of Motion Goal PT LTG, Date Established 08/15/17  -MS      Range of Motion Goal PT LTG, Time to Achieve 3 days  -MS      Range fo Motion Goal PT LTG, Joint L knee  -MS      Range of Motion Goal PT LTG, AROM Measure (5, 85)  -MS        User Key  (r) = Recorded By, (t) = Taken By, (c) = Cosigned By    Initials Name Provider Type    MS Fransisco FISHER Rena, PT Physical Therapist          Physical Therapy Education     Title: PT OT SLP Therapies (Done)     Topic: Physical Therapy (Done)     Point: Mobility training (Done)    Learning Progress Summary    Learner Readiness Method Response Comment Documented by Status   Patient Acceptance E,TB,D NR,ALY  RW 08/16/17 3687 Done     Acceptance E,TB,D VU,NR   08/16/17 1141 Done    Acceptance E,D VU,NR  MS 08/15/17 0857 Done               Point: Home exercise program (Done)    Learning Progress Summary    Learner Readiness Method Response Comment Documented by Status   Patient Acceptance E,TB,D NR,VU   08/16/17 1606 Done    Acceptance E,TB,D VU,NR   08/16/17 1141 Done    Acceptance E,D VU,NR  MS 08/15/17 0857 Done               Point: Body mechanics (Done)    Learning Progress Summary    Learner Readiness Method Response Comment Documented by Status   Patient Acceptance E,TB,D NR,VU   08/16/17 1606 Done    Acceptance E,TB,D VU,NR   08/16/17 1141 Done    Acceptance E,D VU,NR  MS 08/15/17 0857 Done               Point: Precautions (Done)    Learning Progress Summary    Learner Readiness Method Response Comment Documented by Status   Patient Acceptance E,TB,D NR,VU   08/16/17 1606 Done    Acceptance E,TB,D VU,NR   08/16/17 1141 Done    Acceptance E,D VU,NR  MS 08/15/17 0857 Done                      User Key     Initials Effective Dates Name Provider Type Discipline    MS 12/01/15 -  Fransisco Chase, PT Physical Therapist PT     04/06/16 -  Maria Elena Diego, PTA Physical Therapy Assistant PT                    PT Recommendation and Plan  Anticipated Discharge Disposition: skilled nursing facility  Planned Therapy Interventions: balance training, bed mobility training, gait training, home exercise program, patient/family education, postural re-education, ROM (Range of Motion), strengthening, transfer training  PT Frequency: 2 times/day  Plan of Care Review  Plan Of Care Reviewed With: patient  Outcome Summary/Follow up Plan: pt a little more confuse this pm. Verbal cueing required to stay on task w/ exercises and for safety.           Outcome Measures       08/16/17 1100 08/15/17 0800       How much help from another person do you currently need...    Turning from your back to your side while in flat bed without using bedrails? 4  -RW  4  -MS     Moving from lying on back to sitting on the side of a flat bed without bedrails? 3  -RW 3  -MS     Moving to and from a bed to a chair (including a wheelchair)? 3  -RW 3  -MS     Standing up from a chair using your arms (e.g., wheelchair, bedside chair)? 3  -RW 3  -MS     Climbing 3-5 steps with a railing? 3  -RW 3  -MS     To walk in hospital room? 3  -RW 3  -MS     AM-PAC 6 Clicks Score 19  -RW 19  -MS     Functional Assessment    Outcome Measure Options AM-PAC 6 Clicks Basic Mobility (PT)  -RW AM-PAC 6 Clicks Basic Mobility (PT)  -MS       User Key  (r) = Recorded By, (t) = Taken By, (c) = Cosigned By    Initials Name Provider Type    MS Fransisco Chase, PT Physical Therapist    RW Maria Elena Diego PTA Physical Therapy Assistant           Time Calculation:         PT Charges       08/16/17 1603 08/16/17 1139       Time Calculation    Start Time 1500  -RW 1034  -RW     Stop Time 1605  -RW 1125  -RW     Time Calculation (min) 65 min  -RW 51 min  -RW     PT Received On 08/16/17  -RW 08/16/17  -RW     PT - Next Appointment 08/17/17  -RW 08/16/17  -       User Key  (r) = Recorded By, (t) = Taken By, (c) = Cosigned By    Initials Name Provider Type     Maria Elena Diego PTA Physical Therapy Assistant          Therapy Charges for Today     Code Description Service Date Service Provider Modifiers Qty    97340917836 HC PT THER PROC EA 15 MIN 8/15/2017 Maria Elena Diego PTA GP 1    81208091519 HC PT THER PROC GROUP 8/15/2017 Maria Elena Diego PTA GP 1    17294975849 HC PT THER PROC EA 15 MIN 8/16/2017 Maria Elena Diego PTA GP 2    58162943462 HC PT THER PROC GROUP 8/16/2017 Maria Elena Diego PTA GP 1    79246038712 HC PT THER PROC EA 15 MIN 8/16/2017 Maria Elena Diego PTA GP 2    13879748685 HC PT THER PROC GROUP 8/16/2017 Maria Elena Diego PTA GP 1          PT G-Codes  Outcome Measure Options: AM-PAC 6 Clicks Basic Mobility (PT)    Maria Elena Diego PTA  8/16/2017

## 2017-08-16 NOTE — PLAN OF CARE
Problem: Patient Care Overview (Adult)  Goal: Plan of Care Review  Outcome: Ongoing (interventions implemented as appropriate)    08/16/17 1142   Coping/Psychosocial Response Interventions   Plan Of Care Reviewed With patient   Outcome Evaluation   Outcome Summary/Follow up Plan Pt increased exercise tolerance this am despite pain. Ambulation distance limited due to fatigue and pain. Pt w/ increased swelling which limited ROM as well.

## 2017-08-16 NOTE — CONSULTS
Patient Name: Marylu Lunsford  :1945  72 y.o.    Date of Admission: 2017  Date of Consultation:  17  Encounter Provider: Jerald Caal MD  Place of Service: [facility] CARDIOLOGY  Referring Provider: Julian Oshea MD  Patient Care Team:  Joyce Mark MD as PCP - General (Family Medicine)  Christopher Wells MD as Consulting Physician (Cardiology)  Jannette De La Cruz MD as Consulting Physician (Pulmonary Disease)      Chief complaint: s/p knee replacement    Reason for consultation: AFIB    History of Present Illness:    72-year-old female who hadn't seen Dr. Timothy cline in 2016 for paroxysmal atrial fibrillation in the setting of pneumonia.  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  She reportedly had an aneurysm and there was a concern about anticoagulation.  Patient preoperatively had normal sinus rhythm.  She underwent a knee replacement several days ago.  She has had a considerable amount of pain postoperatively.  This was attempted to be controlled and I saw her she just got back from physical therapy and was worn out and her knee hurt.  Patient says she denied ever having a history of atrial fibrillation or ever seeing a cardiologist.  She was actually a little bit confused and really didn't know what I was talking about in general.  She denies any types of chest pain.    Echo 16  · Left ventricular function is normal. Calculated EF = 61.1%. Estimated EF was in agreement with the calculated EF. Estimated EF = 61%. Normal left ventricular cavity size and wall thickness noted. All left ventricular wall segments contract normally. Left ventricular diastolic function is normal.  · Normal left atrial volume noted. Saline test results are negative.  · The aortic valve is abnormal in structure. There is mild thickening of the aortic valve. No aortic valve regurgitation is present. No aortic valve stenosis  is present.  · Mild mitral annular calcification is present. Trace mitral valve regurgitation is present.  · Trace tricuspid valve regurgitation is present. Estimated right ventricular systolic pressure from tricuspid regurgitation is normal (<35 mmHg).    Past Medical History:   Diagnosis Date   • Anxiety    • Arthritis     OSTEO   • Chronic back pain    • COPD (chronic obstructive pulmonary disease)    • Depression    • History of cellulitis     S/P RTHR-(INCISION)   • History of sepsis 2017    R/T BACTERIAL PNEUMONIA   • History of transfusion    • Hyperlipidemia    • Hypertension    • Insomnia    • Lower back pain    • Pneumonia, bacterial 2017   • Presence of partial dental prosthetic device     LOWER   • Sleep apnea     CPAP   • Venous insufficiency (chronic) (peripheral) 12/28/2016       Past Surgical History:   Procedure Laterality Date   • CRANIOTOMY      benign cyst on brainstem   • HIP ARTHROPLASTY Left    • STAPEDECTOMY     • TEETH EXTRACTION      PARTIAL PLATE LOWER   • TOTAL HIP ARTHROPLASTY Right    • TOTAL HIP ARTHROPLASTY REVISION Right    • TUBAL ABDOMINAL LIGATION     • VEIN LIGATION AND STRIPPING Bilateral          Prior to Admission medications    Medication Sig Start Date End Date Taking? Authorizing Provider   acetaminophen (TYLENOL) 500 MG tablet Take 2 tablets by mouth 2 (Two) Times a Day.   Yes Historical Provider, MD   albuterol (VENTOLIN HFA) 108 (90 BASE) MCG/ACT inhaler Inhale 2 puffs Every 4 (Four) Hours As Needed for wheezing or shortness of air. 1/2/17  Yes Richie Gray MD   baclofen (LIORESAL) 10 MG tablet Take 10 mg by mouth 2 (Two) Times a Day As Needed.   Yes Historical Provider, MD   clonazePAM (KlonoPIN) 0.5 MG tablet Take 0.75 mg by mouth 2 (Two) Times a Day As Needed for seizures. Takes 1.5 tablets   Yes Historical Provider, MD   doxazosin (CARDURA) 2 MG tablet Take 2 mg by mouth Every Night. 12/14/16  Yes Historical Provider, MD   DULoxetine (CYMBALTA) 60 MG capsule Take 60  mg by mouth Daily.   Yes Historical Provider, MD   Esomeprazole Magnesium (NEXIUM PO) Take 1 tablet by mouth Daily.   Yes Historical Provider, MD   Fluticasone Furoate-Vilanterol (BREO ELLIPTA) 100-25 MCG/INH aerosol powder  Inhale 1 puff Daily. Pt unsure dose   Yes Historical Provider, MD   furosemide (LASIX) 20 MG tablet Take 20 mg by mouth Daily. 10/26/16  Yes Historical Provider, MD   HYDROcodone-acetaminophen (NORCO) 5-325 MG per tablet Take 1 tablet by mouth Every 4 (Four) Hours As Needed (may take 1 or 2 tablets).   Yes Historical Provider, MD   irbesartan (AVAPRO) 300 MG tablet Take 300 mg by mouth Daily. 12/14/16  Yes Historical Provider, MD   metoprolol succinate XL (TOPROL-XL) 100 MG 24 hr tablet Take 100 mg by mouth Daily. 12/14/16  Yes Historical Provider, MD   simvastatin (ZOCOR) 40 MG tablet Take 40 mg by mouth Every Night. 12/14/16  Yes Historical Provider, MD   Tiotropium Bromide Monohydrate (SPIRIVA RESPIMAT) 2.5 MCG/ACT aerosol solution Inhale 2 puffs Daily. 10/18/16  Yes Historical Provider, MD   guaiFENesin (MUCINEX) 600 MG 12 hr tablet Take 1 tablet by mouth Every 12 (Twelve) Hours. 1/2/17   Richie Gray MD   promethazine (PHENERGAN) 25 MG tablet Take 25 mg by mouth Every 8 (Eight) Hours As Needed. 12/14/16   Historical Provider, MD       Allergies   Allergen Reactions   • Doxycycline Nausea And Vomiting   • Felodipine      Turned pt red   • Nsaids Other (See Comments)     Renal insufficiency       Social History     Social History   • Marital status:      Spouse name: N/A   • Number of children: N/A   • Years of education: N/A     Social History Main Topics   • Smoking status: Current Every Day Smoker     Packs/day: 0.50     Years: 50.00   • Smokeless tobacco: Never Used   • Alcohol use No   • Drug use: Yes     Special: Hydrocodone   • Sexual activity: Defer     Other Topics Concern   • None     Social History Narrative       Family History   Problem Relation Age of Onset   • Lung  cancer Mother    • COPD Father    • Malig Hyperthermia Neg Hx        REVIEW OF SYSTEMS:   All systems reviewed.  Pertinent positives identified in HPI.  All other systems are negative.      Objective:     Vitals:    08/16/17 0352 08/16/17 0700 08/16/17 0745 08/16/17 1100   BP: 144/91 108/77  115/81   BP Location: Left arm Left arm  Right arm   Patient Position: Lying Lying  Sitting   Pulse: (!) 131 (!) 147 80 61   Resp: 18 18 18 18   Temp: 98.2 °F (36.8 °C) 98.4 °F (36.9 °C)  98 °F (36.7 °C)   TempSrc: Oral Oral  Oral   SpO2: 91% 92% 92% 92%   Weight:       Height:         Body mass index is 32.75 kg/(m^2).    General Appearance:    Alert, cooperative, in no acute distress   Head:    Normocephalic, without obvious abnormality, atraumatic   Eyes:            Lids and lashes normal, conjunctivae and sclerae normal, no   icterus, no pallor, corneas clear, PERRLA   Ears:    Ears appear intact with no abnormalities noted   Throat:   No oral lesions, no thrush, oral mucosa moist   Neck:   No adenopathy, supple, trachea midline, no thyromegaly, no   carotid bruit, no JVD   Back:     No kyphosis present, no scoliosis present, no skin lesions, erythema or scars, no tenderness to percussion or palpation, range of motion normal   Lungs:     Clear to auscultation,respirations regular, even and unlabored    Heart:    irregular rhythm and normal rate, normal S1 and S2, no murmur, no gallop, no rub, no click   Chest Wall:    No abnormalities observed   Abdomen:     Normal bowel sounds, no masses, no organomegaly, soft        non-tender, non-distended, no guarding, no rebound  tenderness   Extremities:   Moves all extremities well, 1+ left lower extremity edema, no cyanosis, no redness  Surgery on left knee noted    Pulses:   Pulses palpable and equal bilaterally. Normal radial, carotid, femoral, dorsalis pedis and posterior tibial pulses bilaterally. Normal abdominal aorta   Skin:  Psychiatric:   No bleeding, bruising or rash     Alert and oriented x 3, normal mood and affect   Lab Review:       Results from last 7 days  Lab Units 08/15/17  0516   SODIUM mmol/L 143   POTASSIUM mmol/L 4.2   CHLORIDE mmol/L 105   CO2 mmol/L 27.9   BUN mg/dL 10   CREATININE mg/dL 0.80   CALCIUM mg/dL 9.0   GLUCOSE mg/dL 104*           Results from last 7 days  Lab Units 08/15/17  0516   HEMOGLOBIN g/dL 11.8*   HEMATOCRIT % 35.4*                                I personally viewed and interpreted the patient's EKG/Telemetry data.        Assessment and Plan:       1.  History of paroxysmal atrial fibrillation.  Not surprising she is in atrial fibrillation with the discomfort she is having.  We'll attempt to control heart rate with beta blockers.  Ultimately she probably needs to be placed on anticoagulation with her recent knee as well as her known atrial fibrillation.  This is extremely complicated by the fact that she has an aneurysm in her head and her was a concern of anticoagulation in the past.  Will have neurology evaluate for possible anticoagulation in the future.  However short-term I would hold off on anticoagulation we obviously do not want to complicate her knee and she was in sinus rhythm preoperatively.      This is extremely difficult clinical situation.  I did speak personally and directly to Dr. Dayo Caal MD  08/16/17  3:53 PM

## 2017-08-17 PROCEDURE — 99233 SBSQ HOSP IP/OBS HIGH 50: CPT | Performed by: INTERNAL MEDICINE

## 2017-08-17 PROCEDURE — 93010 ELECTROCARDIOGRAM REPORT: CPT | Performed by: INTERNAL MEDICINE

## 2017-08-17 PROCEDURE — 94799 UNLISTED PULMONARY SVC/PX: CPT

## 2017-08-17 PROCEDURE — 93005 ELECTROCARDIOGRAM TRACING: CPT | Performed by: INTERNAL MEDICINE

## 2017-08-17 PROCEDURE — 97110 THERAPEUTIC EXERCISES: CPT

## 2017-08-17 PROCEDURE — 97150 GROUP THERAPEUTIC PROCEDURES: CPT

## 2017-08-17 RX ADMIN — ATORVASTATIN CALCIUM 20 MG: 20 TABLET, FILM COATED ORAL at 21:00

## 2017-08-17 RX ADMIN — FERROUS SULFATE TAB 325 MG (65 MG ELEMENTAL FE) 325 MG: 325 (65 FE) TAB at 08:27

## 2017-08-17 RX ADMIN — DOCUSATE SODIUM -SENNOSIDES 2 TABLET: 50; 8.6 TABLET, COATED ORAL at 08:27

## 2017-08-17 RX ADMIN — DOCUSATE SODIUM -SENNOSIDES 2 TABLET: 50; 8.6 TABLET, COATED ORAL at 18:04

## 2017-08-17 RX ADMIN — BUDESONIDE AND FORMOTEROL FUMARATE DIHYDRATE 2 PUFF: 80; 4.5 AEROSOL RESPIRATORY (INHALATION) at 09:28

## 2017-08-17 RX ADMIN — HYDROCODONE BITARTRATE AND ACETAMINOPHEN 2 TABLET: 7.5; 325 TABLET ORAL at 02:12

## 2017-08-17 RX ADMIN — BUDESONIDE AND FORMOTEROL FUMARATE DIHYDRATE 2 PUFF: 80; 4.5 AEROSOL RESPIRATORY (INHALATION) at 21:44

## 2017-08-17 RX ADMIN — HYDROCODONE BITARTRATE AND ACETAMINOPHEN 2 TABLET: 7.5; 325 TABLET ORAL at 10:36

## 2017-08-17 RX ADMIN — RIVAROXABAN 20 MG: 20 TABLET, FILM COATED ORAL at 18:04

## 2017-08-17 RX ADMIN — METOPROLOL SUCCINATE 100 MG: 100 TABLET, FILM COATED, EXTENDED RELEASE ORAL at 08:27

## 2017-08-17 RX ADMIN — HYDROCODONE BITARTRATE AND ACETAMINOPHEN 2 TABLET: 7.5; 325 TABLET ORAL at 14:53

## 2017-08-17 RX ADMIN — CLONAZEPAM 0.75 MG: 0.5 TABLET ORAL at 21:00

## 2017-08-17 RX ADMIN — HYDROCODONE BITARTRATE AND ACETAMINOPHEN 2 TABLET: 7.5; 325 TABLET ORAL at 19:31

## 2017-08-17 RX ADMIN — TIOTROPIUM BROMIDE 1 CAPSULE: 18 CAPSULE ORAL; RESPIRATORY (INHALATION) at 09:28

## 2017-08-17 RX ADMIN — CLONAZEPAM 0.75 MG: 0.5 TABLET ORAL at 08:30

## 2017-08-17 RX ADMIN — HYDROCODONE BITARTRATE AND ACETAMINOPHEN 2 TABLET: 7.5; 325 TABLET ORAL at 06:17

## 2017-08-17 RX ADMIN — PANTOPRAZOLE SODIUM 40 MG: 40 TABLET, DELAYED RELEASE ORAL at 06:17

## 2017-08-17 RX ADMIN — HYDROCODONE BITARTRATE AND ACETAMINOPHEN 2 TABLET: 7.5; 325 TABLET ORAL at 23:36

## 2017-08-17 RX ADMIN — DULOXETINE HYDROCHLORIDE 60 MG: 60 CAPSULE, DELAYED RELEASE ORAL at 21:00

## 2017-08-17 NOTE — THERAPY TREATMENT NOTE
Acute Care - Physical Therapy Treatment Note  [facility]     Patient Name: Marylu Lunsford  : 1945  MRN: [id number]  Today's Date: 2017  Onset of Illness/Injury or Date of Surgery Date: 17  Date of Referral to PT: 17  Referring Physician: Julian Brown    Admit Date: 2017    Visit Dx:  No diagnosis found.  Patient Active Problem List   Diagnosis   • Severe sepsis   • Atrial fibrillation   • COPD (chronic obstructive pulmonary disease)   • Venous insufficiency (chronic) (peripheral)   • Pneumonia of right upper lobe due to infectious organism   • Syncope   • Abnormal TSH   • DNR (do not resuscitate)   • Coronavirus infection   • Osteoarthritis, knee               Adult Rehabilitation Note       17 1132 17 1600 17 1100    Rehab Assessment/Intervention    Discipline physical therapist  -MA physical therapy assistant  -RW physical therapy assistant  -RW    Document Type therapy note (daily note)  -MA therapy note (daily note)  -RW therapy note (daily note)  -RW    Subjective Information agree to therapy;complains of;pain  -MA agree to therapy;complains of;pain  -RW agree to therapy;complains of;pain  -RW    Patient Effort, Rehab Treatment good  -MA good  -RW good  -RW    Symptoms Noted Comment Patient reports pain in L knee and fatigue  -MA      Precautions/Limitations fall precautions  -MA fall precautions  -RW fall precautions  -RW    Recorded by [MA] Ketty Dao, PT [RW] Maria Elena Diego, PTA [RW] Maria Elena Diego, PTA    Pain Assessment    Pain Assessment 0-10  -MA 0-10  -RW 0-10  -RW    Pain Score 9  -MA 9  -RW 9  -RW    Post Pain Score 5  -MA      Pain Type Acute pain;Surgical pain  -MA Acute pain;Surgical pain  -RW Acute pain;Surgical pain  -RW    Pain Location Knee  -MA Knee  -RW Knee  -RW    Pain Orientation Left  -MA Left  -RW Left  -RW    Pain Intervention(s) Medication (See MAR);Repositioned;Ambulation/increased activity  -MA Medication (See  MAR);Repositioned;Ambulation/increased activity  -RW Medication (See MAR);Repositioned;Ambulation/increased activity  -RW    Response to Interventions tolerated  -MA tolerated  -RW tolerated  -RW    Recorded by [MA] Ketty Dao, PT [RW] Maria Elena Diego, TAWANNA [RW] Maria Elena Diego PTA    Cognitive Assessment/Intervention    Current Cognitive/Communication Assessment impaired  -MA impaired  -RW functional  -RW    Orientation Status oriented x 4  -MA oriented x 4  -RW oriented x 4  -RW    Follows Commands/Answers Questions 100% of the time;able to follow single-step instructions;needs cueing;needs increased time;needs repetition  -% of the time;needs cueing  -% of the time  -RW    Personal Safety mild impairment;decreased awareness, need for safety;decreased awareness, need for assist  -MA mild impairment  -RW WNL/WFL  -RW    Personal Safety Interventions fall prevention program maintained;gait belt;nonskid shoes/slippers when out of bed  -MA fall prevention program maintained;gait belt;nonskid shoes/slippers when out of bed  -RW fall prevention program maintained;gait belt;nonskid shoes/slippers when out of bed  -RW    Recorded by [MA] Ketty Dao, PT [RW] Maria Elena Diego, PTA [RW] Maria Elena Diego PTA    ROM (Range of Motion)    General ROM Detail L knee 9-90'  -MA  L knee 14-73'  -RW    Recorded by [MA] Ketty Dao, PT  [RW] Maria Elena Diego PTA    Mobility Assessment/Training    Extremity Weight-Bearing Status left lower extremity  -MA      Left Lower Extremity Weight-Bearing weight-bearing as tolerated  -MA      Recorded by [MA] Ketty Dao PT      Bed Mobility, Assessment/Treatment    Bed Mob, Supine to Sit, [place] not tested  -MA not tested  -RW not tested  -RW    Bed Mob, Sit to Supine, [place] not tested  -MA not tested  -RW not tested  -RW    Bed Mobility, Comment Patient up in chair  -MA Pt up in chair  -RW Pt up in chair  -RW    Recorded by [MA] Ketty CARSON  Feliberto, PT [RW] Maria Elena Diego, TAWANNA [RW] Maria Elena Diego PTA    Transfer Assessment/Treatment    Transfers, Sit-Stand [place] contact guard assist;verbal cues required  -MA contact guard assist;verbal cues required  -RW contact guard assist;verbal cues required  -RW    Transfers, Stand-Sit [place] contact guard assist;verbal cues required  -MA contact guard assist;verbal cues required  -RW contact guard assist;verbal cues required  -RW    Transfers, Sit-Stand-Sit, Assist Device rolling walker  -MA rolling walker  -RW rolling walker  -RW    Transfer, Safety Issues sequencing ability decreased;step length decreased  -MA      Recorded by [MA] Ketty Dao, PT [RW] Maria Elena Diego, TAWANNA [RW] Maria Elena Diego PTA    Gait Assessment/Treatment    Gait, [place] Level contact guard assist;verbal cues required  -MA contact guard assist;stand by assist;verbal cues required  -RW contact guard assist;verbal cues required  -RW    Gait, Assistive Device rolling walker  -MA rolling walker  -RW rolling walker  -RW    Gait, Distance (Feet) 125  -  -RW 45  -RW    Gait, Gait Pattern Analysis other (see comments)   improved swing-through gait with increased ambulation  -MA swing-to gait  -RW swing-to gait  -RW    Gait, Gait Deviations ataxia;forward flexed posture;[place] decreased  -MA forward flexed posture;left:;antalgic;leeanna decreased;step length decreased  -RW forward flexed posture;left:;antalgic;[place] decreased;step length decreased  -RW    Gait, Safety Issues step length decreased  -MA step length decreased  -RW step length decreased  -RW    Gait, Comment   distance limited due to pain and fatigue  -RW    Recorded by [MA] Ketty Dao, PT [RW] Maria Elena Diego, PTA [RW] Maria Elena Diego PTA    Therapy Exercises    Exercise Protocols total knee  -MA total knee  -RW total knee  -RW    Total Knee Exercises left:;30 reps;completed protocol  -MA left:;25 reps;completed protocol;SLR   Verbal  cueing required for pt to stay on task  -RW left:;20 reps;completed protocol;with assist;SLR  -RW    Recorded by [MA] Ketty Dao, PT [RW] Maria Elena Diego PTA [RW] Maria Elena Diego PTA    Positioning and Restraints    Pre-Treatment Position sitting in chair/recliner  -MA sitting in chair/recliner  -RW sitting in chair/recliner  -RW    Post Treatment Position chair  -MA chair  -RW chair  -RW    In Chair reclined;call light within reach;encouraged to call for assist;legs elevated  -MA reclined;call light within reach;encouraged to call for assist;exit alarm on  -RW reclined;call light within reach;encouraged to call for assist;exit alarm on  -RW    Recorded by [MA] Ketty Dao, PT [RW] Maria Elena Diego PTA [RW] Maria Elena Diego PTA      08/15/17 1300          Rehab Assessment/Intervention    Discipline physical therapy assistant  -RW      Document Type therapy note (daily note)  -RW      Subjective Information agree to therapy  -RW      Patient Effort, Rehab Treatment good  -RW      Precautions/Limitations fall precautions  -RW      Recorded by [RW] Maria Elena Diego PTA      Pain Assessment    Pain Assessment 0-10  -RW      Pain Score 7  -RW      Pain Type Acute pain;Surgical pain  -RW      Pain Location Knee  -RW      Pain Orientation Left  -RW      Pain Intervention(s) Medication (See MAR);Repositioned;Ambulation/increased activity  -RW      Response to Interventions tolerated  -RW      Recorded by [RW] Maria Elena Diego PTA      Cognitive Assessment/Intervention    Current Cognitive/Communication Assessment functional  -RW      Orientation Status oriented x 4  -RW      Follows Commands/Answers Questions 100% of the time  -RW      Personal Safety WNL/WFL  -RW      Personal Safety Interventions fall prevention program maintained;gait belt;nonskid shoes/slippers when out of bed  -RW      Recorded by [RW] Maria Elena Diego PTA      ROM (Range of Motion)    General ROM Detail L knee 13-84'  -RW       Recorded by [RW] Maria Elena Diego PTA      Bed Mobility, Assessment/Treatment    Bed Mob, Supine to Sit, [place] not tested  -RW      Bed Mob, Sit to Supine, [place] not tested  -RW      Bed Mobility, Comment Pt up in chair  -RW      Recorded by [RW] Maria Elena Diego PTA      Transfer Assessment/Treatment    Transfers, Sit-Stand [place] contact guard assist;verbal cues required  -RW      Transfers, Stand-Sit [place] contact guard assist;verbal cues required  -RW      Transfers, Sit-Stand-Sit, Assist Device rolling walker  -RW      Recorded by [RW] Maria Elena Diego PTA      Gait Assessment/Treatment    Gait, [place] Level contact guard assist;verbal cues required  -RW      Gait, Assistive Device rolling walker  -RW      Gait, Distance (Feet) 120  -RW      Gait, Gait Pattern Analysis swing-through gait  -RW      Gait, Gait Deviations festinating;left:;antalgic;[place] decreased;step length decreased  -RW      Recorded by [RW] Maria Elena Diego PTA      Therapy Exercises    Exercise Protocols total knee  -RW      Total Knee Exercises left:;15 reps;completed protocol  -RW      Recorded by [RW] Maria Elena Diego PTA      Positioning and Restraints    Pre-Treatment Position sitting in chair/recliner  -RW      Post Treatment Position chair  -RW      In Chair reclined;call light within reach;encouraged to call for assist  -RW      Recorded by [RW] Maria Elena Diego PTA        User Key  (r) = Recorded By, (t) = Taken By, (c) = Cosigned By    Initials Name Effective Dates    RW Maria Elena Diego, PTA 04/06/16 -     JUSTIN Dao, PT 12/13/16 -                 IP PT Goals       08/15/17 0858          Bed Mobility PT LTG    Bed Mobility PT LTG, Date Established 08/15/17  -MS      Bed Mobility PT LTG, Time to Achieve 3 days  -MS      Bed Mobility PT LTG, Activity Type all bed mobility  -MS      Bed Mobility PT LTG, [place] Level supervision required  -MS      Transfer Training PT LTG    Transfer  Training PT LTG, Date Established 08/15/17  -MS      Transfer Training PT LTG, Time to Achieve 3 days  -MS      Transfer Training PT LTG, Activity Type all transfers  -MS      Transfer Training PT LTG, [place] Level supervision required  -MS      Transfer Training PT LTG, Assist Device walker, rolling  -MS      Gait Training PT LTG    Gait Training Goal PT LTG, Date Established 08/15/17  -MS      Gait Training Goal PT LTG, Time to Achieve 3 days  -MS      Gait Training Goal PT LTG, [place] Level supervision required  -MS      Gait Training Goal PT LTG, Assist Device walker, rolling  -MS      Gait Training Goal PT LTG, Distance to Achieve 100 feet  -MS      Range of Motion PT LTG    Range of Motion Goal PT LTG, Date Established 08/15/17  -MS      Range of Motion Goal PT LTG, Time to Achieve 3 days  -MS      Range fo Motion Goal PT LTG, Joint L knee  -MS      Range of Motion Goal PT LTG, AROM Measure (5, 85)  -MS        User Key  (r) = Recorded By, (t) = Taken By, (c) = Cosigned By    Initials Name Provider Type    MS Fransisco FISHER Rena, PT Physical Therapist          Physical Therapy Education     Title: PT OT SLP Therapies (Resolved)     Topic: Physical Therapy (Resolved)     Point: Mobility training (Resolved)    Learning Progress Summary    Learner Readiness Method Response Comment Documented by Status   Patient Acceptance E,TB,D NR,VU   08/16/17 1606 Done    Acceptance E,TB,D VU,NR   08/16/17 1141 Done    Acceptance E,D VU,NR  MS 08/15/17 0857 Done               Point: Home exercise program (Resolved)    Learning Progress Summary    Learner Readiness Method Response Comment Documented by Status   Patient Acceptance E,TB,D NR,VU   08/16/17 1606 Done    Acceptance E,TB,D VU,NR   08/16/17 1141 Done    Acceptance E,D VU,NR  MS 08/15/17 0857 Done               Point: Body mechanics (Resolved)    Learning Progress Summary    Learner Readiness Method Response Comment Documented by Status   Patient  Acceptance E,TB,D NR,VU  RW 08/16/17 1606 Done    Acceptance E,TB,D VU,NR  RW 08/16/17 1141 Done    Acceptance E,D VU,NR  MS 08/15/17 0857 Done               Point: Precautions (Resolved)    Learning Progress Summary    Learner Readiness Method Response Comment Documented by Status   Patient Acceptance E,TB,D NR,VU  RW 08/16/17 1606 Done    Acceptance E,TB,D VU,NR   08/16/17 1141 Done    Acceptance E,D VU,NR  MS 08/15/17 0857 Done                      User Key     Initials Effective Dates Name Provider Type Discipline    MS 12/01/15 -  Fransisco Chase, PT Physical Therapist PT     04/06/16 -  Maria Elena Diego, PTA Physical Therapy Assistant PT                    PT Recommendation and Plan  Anticipated Discharge Disposition: skilled nursing facility  Planned Therapy Interventions: balance training, bed mobility training, gait training, home exercise program, patient/family education, postural re-education, ROM (Range of Motion), strengthening, transfer training  PT Frequency: 2 times/day  Plan of Care Review  Plan Of Care Reviewed With: (P) patient  Progress: (P) progress towards functional goals is fair  Outcome Summary/Follow up Plan: (P) Improved ambulation this date despite patient report of 9/10 pain. Will continue to improved functional mobility as tolerated.          Outcome Measures       08/17/17 1100 08/16/17 1100 08/15/17 0800    How much help from another person do you currently need...    Turning from your back to your side while in flat bed without using bedrails? 4  -MA 4  -RW 4  -MS    Moving from lying on back to sitting on the side of a flat bed without bedrails? 4  -MA 3  -RW 3  -MS    Moving to and from a bed to a chair (including a wheelchair)? 3  -MA 3  -RW 3  -MS    Standing up from a chair using your arms (e.g., wheelchair, bedside chair)? 3  -MA 3  -RW 3  -MS    Climbing 3-5 steps with a railing? 3  -MA 3  -RW 3  -MS    To walk in hospital room? 3  -MA 3  -RW 3  -MS    AM-PAC 6 Clicks  Score 20  -MA 19  -RW 19  -MS    Functional Assessment    Outcome Measure Options AM-PAC 6 Clicks Basic Mobility (PT)  -MA AM-PAC 6 Clicks Basic Mobility (PT)  -RW AM-PAC 6 Clicks Basic Mobility (PT)  -MS      User Key  (r) = Recorded By, (t) = Taken By, (c) = Cosigned By    Initials Name Provider Type    MS Fransisco FISHER Rena, PT Physical Therapist    RW Maria Elena Diego, PTA Physical Therapy Assistant    JUSTIN Dao, PT Physical Therapist           Time Calculation:         PT Charges       08/17/17 1139          Time Calculation    Start Time 1042  -MA      Stop Time 1120  -MA      Time Calculation (min) 38 min  -MA      PT Received On 08/17/17  -MA        User Key  (r) = Recorded By, (t) = Taken By, (c) = Cosigned By    Initials Name Provider Type    JUSTIN Dao, PT Physical Therapist          Therapy Charges for Today     Code Description Service Date Service Provider Modifiers Qty    98222592507 HC PT THER PROC GROUP 8/17/2017 Ketty Dao, PT GP 1          PT G-Codes  Outcome Measure Options: AM-PAC 6 Clicks Basic Mobility (PT)    Ketty Dao, PT  8/17/2017

## 2017-08-17 NOTE — PROGRESS NOTES
Hospital Follow Up    LOS:  LOS: 3 days   Patient Name: Marylu Lunsford  Age/Sex: 72 y.o. female  : 1945  MRN: [id number]    Day of Service: 17   Length of Stay: 3  Encounter Provider: Jerald Caal MD  Place of Service: [facility] CARDIOLOGY  Patient Care Team:  Joyce Mark MD as PCP - General (Family Medicine)  Christopher Wells MD as Consulting Physician (Cardiology)  Jannette De La Cruz MD as Consulting Physician (Pulmonary Disease)    Subjective:     Chief Complaint: Paroxysmal atrial fibrillation.    Interval History: Patient is doing significantly better this morning.  Dr. Oshea called me this morning patient does not have a history of a cerebral aneurysm.    Objective:     Objective:  Temp:  [97.5 °F (36.4 °C)-98.9 °F (37.2 °C)] 98.3 °F (36.8 °C)  Heart Rate:  [] 73  Resp:  [16-18] 16  BP: ()/(58-81) 100/66     Intake/Output Summary (Last 24 hours) at 17 0902  Last data filed at 17 1827   Gross per 24 hour   Intake              240 ml   Output              200 ml   Net               40 ml     Body mass index is 32.75 kg/(m^2).  Last 3 weights    17  1120   Weight: 184 lb 14.4 oz (83.9 kg)     Weight change:       Physical Exam:   General : Alert, cooperative, in no acute distress.  Neuro: alert,cooperative and oriented  Lungs: CTAB. Normal respiratory effort and rate.  CV:: Regular rate and rhythm, normal S1 and S2, no murmurs, gallops or rubs.  ABD: Soft, nontender, non-distended. positive bowel sounds  Extr: 1+ edema left leg or cyanosis, moves all extremities    Lab Review:     Results from last 7 days  Lab Units 08/15/17  0516   SODIUM mmol/L 143   POTASSIUM mmol/L 4.2   CHLORIDE mmol/L 105   CO2 mmol/L 27.9   BUN mg/dL 10   CREATININE mg/dL 0.80   GLUCOSE mg/dL 104*   CALCIUM mg/dL 9.0           Results from last 7 days  Lab Units 08/15/17  0516   HEMOGLOBIN g/dL 11.8*   HEMATOCRIT % 35.4*                            Current Medications:   Scheduled Meds:  aspirin 325 mg Oral Daily   atorvastatin 20 mg Oral Daily   budesonide-formoterol 2 puff Inhalation BID - RT   clonazePAM 0.75 mg Oral Q12H   DULoxetine 60 mg Oral Nightly   ferrous sulfate 325 mg Oral Daily With Breakfast   furosemide 20 mg Oral Daily   losartan 100 mg Oral Nightly   metoprolol succinate  mg Oral Daily   pantoprazole 40 mg Oral QAM   sennosides-docusate sodium 2 tablet Oral BID   terazosin 2 mg Oral Nightly   tiotropium 1 capsule Inhalation Daily - RT     Continuous Infusions:  lactated ringers 100 mL/hr Last Rate: 100 mL/hr (08/14/17 2316)   lactated ringers 9 mL/hr Last Rate: 9 mL/hr (08/14/17 1220)       Allergies:  Allergies   Allergen Reactions   • Doxycycline Nausea And Vomiting   • Felodipine      Turned pt red   • Nsaids Other (See Comments)     Renal insufficiency       Assessment:     Active Problems:    Osteoarthritis, knee        Plan:      1.  Paroxysmal atrial fibrillation postoperative in the setting of a lot of pain.  Pain is better by physical exam she appears to be back in a sinus rhythm EKG is pending.  She still however will need to be anticoagulated at least in the short-term.  Would initiate Xarelto 20 mg daily this evening and plan on discharging tomorrow.  Heart rate is nicely controlled continue the same.  2.  Status post knee surgery  3.  We'll cancel neurology consult.  Okay to discharge in the morning if heart rate and blood pressure are stable when Dr. Oshea rounds.    Jerald Caal MD  08/17/17  9:02 AM

## 2017-08-17 NOTE — PLAN OF CARE
Problem: Patient Care Overview (Adult)  Goal: Plan of Care Review  Outcome: Ongoing (interventions implemented as appropriate)    08/17/17 8885   Coping/Psychosocial Response Interventions   Plan Of Care Reviewed With patient   Patient Care Overview   Progress progress toward functional goals as expected   Outcome Evaluation   Outcome Summary/Follow up Plan Neuro consult was dc'd by cardiology, due to pt. not having an aneurism per Dr. Oshea - If BP and HR state stable, cardiology ok for discharge. pain well controlled. plan is to go to [facility].        Goal: Adult Individualization and Mutuality  Outcome: Outcome(s) achieved Date Met:  08/17/17    Problem: Fall Risk (Adult)  Goal: Absence of Falls  Outcome: Ongoing (interventions implemented as appropriate)    Problem: Knee Replacement, Total (Adult)  Goal: Signs and Symptoms of Listed Potential Problems Will be Absent or Manageable (Knee Replacement, Total)  Outcome: Ongoing (interventions implemented as appropriate)

## 2017-08-17 NOTE — PROGRESS NOTES
"/64 (BP Location: Right arm, Patient Position: Sitting)  Pulse 100  Temp 98.7 °F (37.1 °C) (Oral)   Resp 16  Ht 63\" (160 cm)  Wt 184 lb 14.4 oz (83.9 kg)  SpO2 (!) 86%  BMI 32.75 kg/m2    Lab Results (last 24 hours)     ** No results found for the last 24 hours. **          Imaging Results (last 24 hours)     ** No results found for the last 24 hours. **          Patient Care Team:  Joyce Mark MD as PCP - General (Family Medicine)  Christopher Wells MD as Consulting Physician (Cardiology)  Jannette De La Cruz MD as Consulting Physician (Pulmonary Disease)    SUBJECTIVE  Knee is doing well  PHYSICAL EXAM   Wound fine  Active Problems:    Osteoarthritis, knee  Atrial fibrillation    PLAN / DISPOSITION:  Anticoagulation as per cardiology  Discharge when ok with cardiology  Julian Oshea MD  08/17/17  6:31 AM      "

## 2017-08-17 NOTE — PLAN OF CARE
Problem: Patient Care Overview (Adult)  Goal: Plan of Care Review  Outcome: Ongoing (interventions implemented as appropriate)    08/17/17 0312   Coping/Psychosocial Response Interventions   Plan Of Care Reviewed With patient   Patient Care Overview   Progress improving   Outcome Evaluation   Outcome Summary/Follow up Plan Pt has been stable through the night. Ambulates with walker use and staff assistance x1. Attempts to get up on own, non-compliant at times. Pain well controlled with Lortabs. Tachycardic with A-fib, cardiology aware. Educated on SA management and proper use of CPAP.        Goal: Adult Individualization and Mutuality  Outcome: Ongoing (interventions implemented as appropriate)  Goal: Discharge Needs Assessment  Outcome: Ongoing (interventions implemented as appropriate)    Problem: Fall Risk (Adult)  Goal: Absence of Falls  Outcome: Ongoing (interventions implemented as appropriate)    08/17/17 0312   Fall Risk (Adult)   Absence of Falls achieves outcome         Problem: Knee Replacement, Total (Adult)  Goal: Signs and Symptoms of Listed Potential Problems Will be Absent or Manageable (Knee Replacement, Total)  Outcome: Ongoing (interventions implemented as appropriate)    08/17/17 0312   Knee Replacement, Total   Problems Assessed (Total Knee Replacement) all   Problems Present (Total Knee Replacement) pain;decreased range of motion

## 2017-08-17 NOTE — PLAN OF CARE
Problem: Patient Care Overview (Adult)  Goal: Plan of Care Review    08/17/17 1137   Coping/Psychosocial Response Interventions   Plan Of Care Reviewed With patient   Patient Care Overview   Progress progress towards functional goals is fair   Outcome Evaluation   Outcome Summary/Follow up Plan Improved ambulation this date despite patient report of 9/10 pain. Will continue to improved functional mobility as tolerated.

## 2017-08-18 PROCEDURE — 97150 GROUP THERAPEUTIC PROCEDURES: CPT

## 2017-08-18 PROCEDURE — 97110 THERAPEUTIC EXERCISES: CPT

## 2017-08-18 PROCEDURE — 94799 UNLISTED PULMONARY SVC/PX: CPT

## 2017-08-18 RX ADMIN — RIVAROXABAN 20 MG: 20 TABLET, FILM COATED ORAL at 17:29

## 2017-08-18 RX ADMIN — BUDESONIDE AND FORMOTEROL FUMARATE DIHYDRATE 2 PUFF: 80; 4.5 AEROSOL RESPIRATORY (INHALATION) at 08:48

## 2017-08-18 RX ADMIN — HYDROCODONE BITARTRATE AND ACETAMINOPHEN 2 TABLET: 7.5; 325 TABLET ORAL at 14:26

## 2017-08-18 RX ADMIN — TIOTROPIUM BROMIDE 1 CAPSULE: 18 CAPSULE ORAL; RESPIRATORY (INHALATION) at 08:47

## 2017-08-18 RX ADMIN — FERROUS SULFATE TAB 325 MG (65 MG ELEMENTAL FE) 325 MG: 325 (65 FE) TAB at 10:18

## 2017-08-18 RX ADMIN — HYDROCODONE BITARTRATE AND ACETAMINOPHEN 2 TABLET: 7.5; 325 TABLET ORAL at 03:38

## 2017-08-18 RX ADMIN — FUROSEMIDE 20 MG: 20 TABLET ORAL at 10:17

## 2017-08-18 RX ADMIN — HYDROCODONE BITARTRATE AND ACETAMINOPHEN 2 TABLET: 7.5; 325 TABLET ORAL at 10:18

## 2017-08-18 RX ADMIN — PANTOPRAZOLE SODIUM 40 MG: 40 TABLET, DELAYED RELEASE ORAL at 06:47

## 2017-08-18 RX ADMIN — CLONAZEPAM 0.75 MG: 0.5 TABLET ORAL at 21:38

## 2017-08-18 RX ADMIN — HYDROCODONE BITARTRATE AND ACETAMINOPHEN 2 TABLET: 7.5; 325 TABLET ORAL at 07:43

## 2017-08-18 RX ADMIN — DOCUSATE SODIUM -SENNOSIDES 2 TABLET: 50; 8.6 TABLET, COATED ORAL at 10:17

## 2017-08-18 RX ADMIN — ATORVASTATIN CALCIUM 20 MG: 20 TABLET, FILM COATED ORAL at 21:38

## 2017-08-18 RX ADMIN — HYDROCODONE BITARTRATE AND ACETAMINOPHEN 2 TABLET: 7.5; 325 TABLET ORAL at 18:03

## 2017-08-18 RX ADMIN — DOCUSATE SODIUM -SENNOSIDES 2 TABLET: 50; 8.6 TABLET, COATED ORAL at 17:29

## 2017-08-18 RX ADMIN — HYDROCODONE BITARTRATE AND ACETAMINOPHEN 2 TABLET: 7.5; 325 TABLET ORAL at 22:00

## 2017-08-18 RX ADMIN — METOPROLOL SUCCINATE 100 MG: 100 TABLET, FILM COATED, EXTENDED RELEASE ORAL at 10:18

## 2017-08-18 RX ADMIN — DULOXETINE HYDROCHLORIDE 60 MG: 60 CAPSULE, DELAYED RELEASE ORAL at 21:41

## 2017-08-18 RX ADMIN — CLONAZEPAM 0.75 MG: 0.5 TABLET ORAL at 10:17

## 2017-08-18 RX ADMIN — BUDESONIDE AND FORMOTEROL FUMARATE DIHYDRATE 2 PUFF: 80; 4.5 AEROSOL RESPIRATORY (INHALATION) at 22:44

## 2017-08-18 NOTE — PLAN OF CARE
Problem: Patient Care Overview (Adult)  Goal: Plan of Care Review  Outcome: Ongoing (interventions implemented as appropriate)    08/18/17 0506   Coping/Psychosocial Response Interventions   Plan Of Care Reviewed With patient   Patient Care Overview   Progress progress toward functional goals as expected   Outcome Evaluation   Outcome Summary/Follow up Plan Pt is a post op day 3 of a left total knee. Dressing is clean dry and intact. Pt continues to be non compliant with asking for help. Bed and chair alarm in place. Pt continues with the cpap at night. Pt educated on importance of monitoring blood pressure related to comorbidity of HTN. Pt voiced understanding. Pt is possibly going to [facility] in AM.       Goal: Discharge Needs Assessment  Outcome: Ongoing (interventions implemented as appropriate)    Problem: Fall Risk (Adult)  Goal: Absence of Falls  Outcome: Ongoing (interventions implemented as appropriate)    Problem: Knee Replacement, Total (Adult)  Goal: Signs and Symptoms of Listed Potential Problems Will be Absent or Manageable (Knee Replacement, Total)  Outcome: Ongoing (interventions implemented as appropriate)

## 2017-08-18 NOTE — PLAN OF CARE
Problem: Patient Care Overview (Adult)  Goal: Plan of Care Review  Outcome: Ongoing (interventions implemented as appropriate)    08/18/17 1605   Coping/Psychosocial Response Interventions   Plan Of Care Reviewed With patient   Patient Care Overview   Progress progress toward functional goals as expected   Outcome Evaluation   Outcome Summary/Follow up Plan pt w/incr fatigue and c/o incr swelling , incr pain--limited amb dist this pm

## 2017-08-18 NOTE — PROGRESS NOTES
Continued Stay Note  [facility]     Patient Name: Marylu Lunsford  MRN: [id number]  Today's Date: 8/18/2017    Admit Date: 8/14/2017          Discharge Plan       08/18/17 1530    Case Management/Social Work Plan    Additional Comments Plan remains to d/c to [facility], pt's bed is available through the weekend per Marcia/Leonel. Transfer packet on chart.               Discharge Codes     None            Kenna Sauer RN

## 2017-08-18 NOTE — PLAN OF CARE
Problem: Patient Care Overview (Adult)  Goal: Plan of Care Review  Outcome: Ongoing (interventions implemented as appropriate)    08/17/17 4942   Outcome Evaluation   Outcome Summary/Follow up Plan Patient educated on her low bp readings and her medications.

## 2017-08-18 NOTE — PROGRESS NOTES
"/78 (BP Location: Left arm, Patient Position: Lying)  Pulse 85  Temp 99.2 °F (37.3 °C) (Oral)   Resp 16  Ht 63\" (160 cm)  Wt 184 lb 14.4 oz (83.9 kg)  SpO2 99%  BMI 32.75 kg/m2    Lab Results (last 24 hours)     ** No results found for the last 24 hours. **          Imaging Results (last 24 hours)     ** No results found for the last 24 hours. **          Patient Care Team:  Joyce Mark MD as PCP - General (Family Medicine)  Christopher Wells MD as Consulting Physician (Cardiology)  Jannette De La Cruz MD as Consulting Physician (Pulmonary Disease)    SUBJECTIVE  Knee doing well  PHYSICAL EXAM  She does have some edema in both legs, more left than right  She also has more leg erythema than typical.  This is not really associated with her wound, but involves much of her lower leg   Active Problems:    Osteoarthritis, knee      PLAN / DISPOSITION:  She has been started on Xarelto  I want to watch the leg erythema, and I am holding her discharge till tomorrow.  Will resume her lasix which was held due to relatively low BP  Julian Oshea MD  08/18/17  7:28 AM      "

## 2017-08-18 NOTE — PLAN OF CARE
Problem: Patient Care Overview (Adult)  Goal: Plan of Care Review  Outcome: Ongoing (interventions implemented as appropriate)    08/18/17 7537   Coping/Psychosocial Response Interventions   Plan Of Care Reviewed With patient   Patient Care Overview   Progress progress toward functional goals as expected   Outcome Evaluation   Outcome Summary/Follow up Plan Patient ambulating well w/wlkr. Incision d/i. Left leg distal end slightly red, MD aware, ok'd lasix for BLE edema. Lasix given, BLE look better this afternoon. Left distal leg redness is better. Patient is non compliant with staying in chair or bed. Will not call out and gets up on her on. Bed and Chair alarms in use. Pt. is not confused. Educated patient on why lasix was being given today and patient acknowledge understanding.        Goal: Discharge Needs Assessment  Outcome: Ongoing (interventions implemented as appropriate)    Problem: Fall Risk (Adult)  Goal: Absence of Falls  Outcome: Ongoing (interventions implemented as appropriate)    Problem: Knee Replacement, Total (Adult)  Goal: Signs and Symptoms of Listed Potential Problems Will be Absent or Manageable (Knee Replacement, Total)  Outcome: Ongoing (interventions implemented as appropriate)

## 2017-08-18 NOTE — THERAPY TREATMENT NOTE
Acute Care - Physical Therapy Treatment Note  [facility]     Patient Name: Marylu Lunsford  : 1945  MRN: [id number]  Today's Date: 2017  Onset of Illness/Injury or Date of Surgery Date: 17  Date of Referral to PT: 17  Referring Physician: Julian Brown    Admit Date: 2017    Visit Dx:  No diagnosis found.  Patient Active Problem List   Diagnosis   • Severe sepsis   • Atrial fibrillation   • COPD (chronic obstructive pulmonary disease)   • Venous insufficiency (chronic) (peripheral)   • Pneumonia of right upper lobe due to infectious organism   • Syncope   • Abnormal TSH   • DNR (do not resuscitate)   • Coronavirus infection   • Osteoarthritis, knee               Adult Rehabilitation Note       17 1556 17 1539 17 1132    Rehab Assessment/Intervention    Discipline physical therapy assistant  -JM physical therapist  -MA physical therapist  -MA    Document Type therapy note (daily note)  -JM therapy note (daily note)  -MA therapy note (daily note)  -MA    Subjective Information agree to therapy;complains of;fatigue;pain;swelling  - agree to therapy;complains of;pain  -MA agree to therapy;complains of;pain  -MA    Patient Effort, Rehab Treatment  adequate  -MA good  -MA    Symptoms Noted Comment  Patient reports continued pain in L knee  -MA Patient reports pain in L knee and fatigue  -MA    Precautions/Limitations fall precautions  -JM fall precautions  -MA fall precautions  -MA    Recorded by [JM] Kelsey Harkins, PTA [MA] Ketty Doa, PT [MA] Ketty Dao, PT    Pain Assessment    Pain Assessment 0-10  -JM 0-10  -MA 0-10  -MA    Pain Score 9  -JM 10   patient reported 12/10 pain; no signs of acute distress note  -MA 9  -MA    Post Pain Score  10  -MA 5  -MA    Pain Type Surgical pain  -JM Acute pain  -MA Acute pain;Surgical pain  -MA    Pain Location Knee  -JM Knee  -MA Knee  -MA    Pain Orientation Left  -JM Left  -MA Left  -MA    Pain  Intervention(s) Medication (See MAR);Repositioned  -JM Medication (See MAR);Repositioned  -MA Medication (See MAR);Repositioned;Ambulation/increased activity  -MA    Response to Interventions jane  -JM tolerated  -MA tolerated  -MA    Recorded by [JM] Kelsey Harkins PTA [MA] Ketty Dao PT [MA] Ketty Dao, PT    Cognitive Assessment/Intervention    Current Cognitive/Communication Assessment  impaired  -MA impaired  -MA    Orientation Status  oriented x 4  -MA oriented x 4  -MA    Follows Commands/Answers Questions  100% of the time;able to follow single-step instructions;needs cueing;needs increased time;needs repetition  -% of the time;able to follow single-step instructions;needs cueing;needs increased time;needs repetition  -MA    Personal Safety  mild impairment;decreased awareness, need for safety;decreased awareness, need for assist  -MA mild impairment;decreased awareness, need for safety;decreased awareness, need for assist  -MA    Personal Safety Interventions  fall prevention program maintained;gait belt;nonskid shoes/slippers when out of bed  -MA fall prevention program maintained;gait belt;nonskid shoes/slippers when out of bed  -MA    Recorded by  [MA] Ketty Dao, PT [MA] Ketty Dao, PT    ROM (Range of Motion)    General ROM Detail L knee -4-85  -JM  L knee 9-90'  -MA    Recorded by [JM] Kelsey Harkins PTA  [MA] Ketty Dao, PT    Mobility Assessment/Training    Extremity Weight-Bearing Status  left lower extremity  -MA left lower extremity  -MA    Left Lower Extremity Weight-Bearing  weight-bearing as tolerated  -MA weight-bearing as tolerated  -MA    Recorded by  [MA] Ketty Dao, PT [MA] Ketty Dao, PT    Bed Mobility, Assessment/Treatment    Bed Mobility, Scoot/Bridge, [place] contact guard assist  -JM      Bed Mob, Supine to Sit, [place]  not tested  -MA not tested  -MA    Bed Mob, Sit to Supine, [place]  not tested  -MA  not tested  -MA    Bed Mobility, Comment in chair  - Patient up in chair  -MA Patient up in chair  -MA    Recorded by [JM] Kelsey Harkins, PTA [MA] Ketty Doa, PT [MA] Ketty Dao, PT    Transfer Assessment/Treatment    Transfers, Sit-Stand [place] contact guard assist;verbal cues required  - contact guard assist;verbal cues required  -MA contact guard assist;verbal cues required  -MA    Transfers, Stand-Sit [place] supervision required;verbal cues required  - contact guard assist;verbal cues required  -MA contact guard assist;verbal cues required  -MA    Transfers, Sit-Stand-Sit, Assist Device rolling walker  - rolling walker  -MA rolling walker  -MA    Transfer, Safety Issues  sequencing ability decreased  -MA sequencing ability decreased;step length decreased  -MA    Transfer, Impairments pain;strength decreased;impaired balance  -      Recorded by [] Kelsey Harkins PTA [MA] Ketty Dao, PT [MA] Ketty Dao, PT    Gait Assessment/Treatment    Gait, [place] Level contact guard assist;verbal cues required  - contact guard assist;verbal cues required  -MA contact guard assist;verbal cues required  -MA    Gait, Assistive Device rolling walker  - rolling walker  -MA rolling walker  -MA    Gait, Distance (Feet) 100  -  -  -MA    Gait, Gait Pattern Analysis  swing-through gait  -MA other (see comments)   improved swing-through gait with increased ambulation  -MA    Gait, Gait Deviations forward flexed posture;step length decreased;stride width increased  - leeanna decreased;forward flexed posture;antalgic  -MA ataxia;forward flexed posture;leeanna decreased  -MA    Gait, Safety Issues step length decreased;weight-shifting ability decreased;balance decreased during turns  - step length decreased  -MA step length decreased  -MA    Gait, Impairments decreased flexibility;impaired balance;pain  -      Gait, Comment pain limiting dist and  urgency for BR  -JM      Recorded by [JM] Kelsey Harkins PTA [MA] Ketty Dao, PT [MA] Ketty Dao PT    Therapy Exercises    Exercise Protocols total knee  -JM total knee  -MA total knee  -MA    Total Knee Exercises left:;30 reps;completed protocol   cues for each exer  -JM 30 reps;completed protocol;with assist;SLR  -MA left:;30 reps;completed protocol  -MA    Recorded by [JM] Kelsey Harkins PTA [MA] Ketty Dao, PT [MA] Ketty Dao PT    Positioning and Restraints    Pre-Treatment Position sitting in chair/recliner  -JM sitting in chair/recliner  -MA sitting in chair/recliner  -MA    Post Treatment Position  bathroom  -MA chair  -MA    In Chair  --  -MA reclined;call light within reach;encouraged to call for assist;legs elevated  -MA    Bathroom with nsg  -JM --   with nursing staff  -MA     Recorded by [JM] Kelsey Harkins PTA [MA] Ketty Dao, PT [MA] Ketty Dao, PT      08/16/17 1600 08/16/17 1100       Rehab Assessment/Intervention    Discipline physical therapy assistant  -RW physical therapy assistant  -RW     Document Type therapy note (daily note)  -RW therapy note (daily note)  -RW     Subjective Information agree to therapy;complains of;pain  -RW agree to therapy;complains of;pain  -RW     Patient Effort, Rehab Treatment good  -RW good  -RW     Precautions/Limitations fall precautions  -RW fall precautions  -RW     Recorded by [RW] Maria Elena Diego PTA [RW] Maria Elena Diego PTA     Pain Assessment    Pain Assessment 0-10  -RW 0-10  -RW     Pain Score 9  -RW 9  -RW     Pain Type Acute pain;Surgical pain  -RW Acute pain;Surgical pain  -RW     Pain Location Knee  -RW Knee  -RW     Pain Orientation Left  -RW Left  -RW     Pain Intervention(s) Medication (See MAR);Repositioned;Ambulation/increased activity  -RW Medication (See MAR);Repositioned;Ambulation/increased activity  -RW     Response to Interventions tolerated  -RW tolerated  -RW     Recorded by [RW]  Maria Elena Diego PTA [RW] Maria Elena Diego PTA     Cognitive Assessment/Intervention    Current Cognitive/Communication Assessment impaired  -RW functional  -RW     Orientation Status oriented x 4  -RW oriented x 4  -RW     Follows Commands/Answers Questions 100% of the time;needs cueing  -% of the time  -RW     Personal Safety mild impairment  -RW WNL/WFL  -RW     Personal Safety Interventions fall prevention program maintained;gait belt;nonskid shoes/slippers when out of bed  -RW fall prevention program maintained;gait belt;nonskid shoes/slippers when out of bed  -RW     Recorded by [RW] Maria Elena Diego PTA [RW] Maria Elena Diego PTA     ROM (Range of Motion)    General ROM Detail  L knee 14-73'  -RW     Recorded by  [RW] Maria Elena Diego PTA     Bed Mobility, Assessment/Treatment    Bed Mob, Supine to Sit, [place] not tested  -RW not tested  -RW     Bed Mob, Sit to Supine, [place] not tested  -RW not tested  -RW     Bed Mobility, Comment Pt up in chair  -RW Pt up in chair  -RW     Recorded by [RW] Maria Elena Diego PTA [RW] Maria Elena Diego PTA     Transfer Assessment/Treatment    Transfers, Sit-Stand [place] contact guard assist;verbal cues required  -RW contact guard assist;verbal cues required  -RW     Transfers, Stand-Sit [place] contact guard assist;verbal cues required  -RW contact guard assist;verbal cues required  -RW     Transfers, Sit-Stand-Sit, Assist Device rolling walker  -RW rolling walker  -RW     Recorded by [RW] Maria Elena Diego PTA [RW] Maria Elena Diego PTA     Gait Assessment/Treatment    Gait, [place] Level contact guard assist;stand by assist;verbal cues required  -RW contact guard assist;verbal cues required  -RW     Gait, Assistive Device rolling walker  -RW rolling walker  -RW     Gait, Distance (Feet) 100  -RW 45  -RW     Gait, Gait Pattern Analysis swing-to gait  -RW swing-to gait  -RW     Gait, Gait Deviations forward flexed posture;left:;antalgic;leeanna  decreased;step length decreased  -RW forward flexed posture;left:;antalgic;leeanna decreased;step length decreased  -RW     Gait, Safety Issues step length decreased  -RW step length decreased  -RW     Gait, Comment  distance limited due to pain and fatigue  -RW     Recorded by [RW] Maria Elena Diego PTA [RW] Maria Elena Diego PTA     Therapy Exercises    Exercise Protocols total knee  -RW total knee  -RW     Total Knee Exercises left:;25 reps;completed protocol;SLR   Verbal cueing required for pt to stay on task  -RW left:;20 reps;completed protocol;with assist;SLR  -RW     Recorded by [RW] Maria Elena Diego PTA [RW] Maria Elena Diego PTA     Positioning and Restraints    Pre-Treatment Position sitting in chair/recliner  -RW sitting in chair/recliner  -RW     Post Treatment Position chair  -RW chair  -RW     In Chair reclined;call light within reach;encouraged to call for assist;exit alarm on  -RW reclined;call light within reach;encouraged to call for assist;exit alarm on  -RW     Recorded by [RW] Maria Elena Diego PTA [RW] Maria Elena Diego PTA       User Key  (r) = Recorded By, (t) = Taken By, (c) = Cosigned By    Initials Name Effective Dates    ZEYAD Harkins, PTA 02/18/16 -     RW Maria Elena Diego, PTA 04/06/16 -     JUSTIN Dao, PT 12/13/16 -                 IP PT Goals       08/15/17 0858          Bed Mobility PT LTG    Bed Mobility PT LTG, Date Established 08/15/17  -MS      Bed Mobility PT LTG, Time to Achieve 3 days  -MS      Bed Mobility PT LTG, Activity Type all bed mobility  -MS      Bed Mobility PT LTG, [place] Level supervision required  -MS      Transfer Training PT LTG    Transfer Training PT LTG, Date Established 08/15/17  -MS      Transfer Training PT LTG, Time to Achieve 3 days  -MS      Transfer Training PT LTG, Activity Type all transfers  -MS      Transfer Training PT LTG, [place] Level supervision required  -MS      Transfer Training PT LTG, Assist Device walker, rolling   -MS      Gait Training PT LTG    Gait Training Goal PT LTG, Date Established 08/15/17  -MS      Gait Training Goal PT LTG, Time to Achieve 3 days  -MS      Gait Training Goal PT LTG, [place] Level supervision required  -MS      Gait Training Goal PT LTG, Assist Device walker, rolling  -MS      Gait Training Goal PT LTG, Distance to Achieve 100 feet  -MS      Range of Motion PT LTG    Range of Motion Goal PT LTG, Date Established 08/15/17  -MS      Range of Motion Goal PT LTG, Time to Achieve 3 days  -MS      Range fo Motion Goal PT LTG, Joint L knee  -MS      Range of Motion Goal PT LTG, AROM Measure (5, 85)  -MS        User Key  (r) = Recorded By, (t) = Taken By, (c) = Cosigned By    Initials Name Provider Type    MS Fransisco FISHER Rena, PT Physical Therapist          Physical Therapy Education     Title: PT OT SLP Therapies (Done)     Topic: Physical Therapy (Done)     Point: Mobility training (Done)    Learning Progress Summary    Learner Readiness Method Response Comment Documented by Status   Patient Acceptance E,TB,D VU,NR   08/18/17 1603 Done    Acceptance E VU  MA 08/17/17 1543 Done    Acceptance E,TB,D NR,VU   08/16/17 1606 Done    Acceptance E,TB,D VU,NR   08/16/17 1141 Done    Acceptance E,D VU,NR  MS 08/15/17 0857 Done               Point: Home exercise program (Done)    Learning Progress Summary    Learner Readiness Method Response Comment Documented by Status   Patient Acceptance E,TB,D VU,NR   08/18/17 1603 Done    Acceptance E VU  MA 08/17/17 1543 Done    Acceptance E,TB,D NR,VU   08/16/17 1606 Done    Acceptance E,TB,D VU,NR   08/16/17 1141 Done    Acceptance E,D VU,NR  MS 08/15/17 0857 Done               Point: Body mechanics (Done)    Learning Progress Summary    Learner Readiness Method Response Comment Documented by Status   Patient Acceptance E,TB,D VU,NR   08/18/17 1603 Done    Acceptance E VU  MA 08/17/17 1543 Done    Acceptance E,TB,D NR,VU   08/16/17 1606 Done     Acceptance E,TB,D VU,NR   08/16/17 1141 Done    Acceptance E,D VU,NR  MS 08/15/17 0857 Done               Point: Precautions (Done)    Learning Progress Summary    Learner Readiness Method Response Comment Documented by Status   Patient Acceptance E,TB,D VU,NR   08/18/17 1603 Done    Acceptance E VU  MA 08/17/17 1543 Done    Acceptance E,TB,D NR,VU   08/16/17 1606 Done    Acceptance E,TB,D VU,NR   08/16/17 1141 Done    Acceptance E,D VU,NR  MS 08/15/17 0857 Done                      User Key     Initials Effective Dates Name Provider Type Discipline     02/18/16 -  Kelsey Harkins, PTA Physical Therapy Assistant PT    MS 12/01/15 -  Fransisco Chase, PT Physical Therapist PT     04/06/16 -  Maria Elena Diego, PTA Physical Therapy Assistant PT    MA 12/13/16 -  Ketty Dao, PT Physical Therapist PT                    PT Recommendation and Plan  Anticipated Discharge Disposition: skilled nursing facility  Planned Therapy Interventions: balance training, bed mobility training, gait training, home exercise program, patient/family education, postural re-education, ROM (Range of Motion), strengthening, transfer training  PT Frequency: 2 times/day  Plan of Care Review  Plan Of Care Reviewed With: patient  Progress: progress toward functional goals as expected  Outcome Summary/Follow up Plan: pt w/incr fatigue and c/o incr swelling , incr pain--limited amb dist this pm          Outcome Measures       08/18/17 1600 08/17/17 1100 08/16/17 1100    How much help from another person do you currently need...    Turning from your back to your side while in flat bed without using bedrails? 4  -JM 4  -MA 4  -RW    Moving from lying on back to sitting on the side of a flat bed without bedrails? 3  -JM 4  -MA 3  -RW    Moving to and from a bed to a chair (including a wheelchair)? 3  -JM 3  -MA 3  -RW    Standing up from a chair using your arms (e.g., wheelchair, bedside chair)? 3  - 3  -MA 3  -RW    Climbing 3-5  steps with a railing? 2  -JM 3  -MA 3  -RW    To walk in hospital room? 3  -JM 3  -MA 3  -RW    AM-PAC 6 Clicks Score 18  -JM 20  -MA 19  -RW    Functional Assessment    Outcome Measure Options  AM-PAC 6 Clicks Basic Mobility (PT)  -MA AM-PAC 6 Clicks Basic Mobility (PT)  -RW      User Key  (r) = Recorded By, (t) = Taken By, (c) = Cosigned By    Initials Name Provider Type    ZEYAD Harkins PTA Physical Therapy Assistant    VIVIANE Diego, PTA Physical Therapy Assistant    JUSTIN Dao, PT Physical Therapist           Time Calculation:         PT Charges       08/18/17 1602 08/18/17 1129       Time Calculation    Start Time 1420  -      Stop Time 1530  -      Time Calculation (min) 70 min  -      PT Received On 08/18/17  -      PT - Next Appointment 08/19/17  - 08/18/17  -       User Key  (r) = Recorded By, (t) = Taken By, (c) = Cosigned By    Initials Name Provider Type    ZEYAD Harkins PTA Physical Therapy Assistant          Therapy Charges for Today     Code Description Service Date Service Provider Modifiers Qty    82781354062 HC PT THER PROC EA 15 MIN 8/18/2017 Kelsey Harkins PTA GP 2    31805523317 HC PT THER PROC GROUP 8/18/2017 Kelsey Harkins PTA GP 1          PT G-Codes  Outcome Measure Options: AM-PAC 6 Clicks Basic Mobility (PT)    Kelsey Harkins PTA  8/18/2017

## 2017-08-18 NOTE — SIGNIFICANT NOTE
08/18/17 1129   Rehab Treatment   Discipline physical therapy assistant   Treatment Not Performed patient/family declined treatment  (bilat le swelling, pt awaiting med MD follow-up and elevation of le's; possibly pm gym, d/c delayed; MOLINA Vasquez aware)   Recommendation   PT - Next Appointment 08/18/17

## 2017-08-19 VITALS
WEIGHT: 184.9 LBS | SYSTOLIC BLOOD PRESSURE: 109 MMHG | RESPIRATION RATE: 18 BRPM | DIASTOLIC BLOOD PRESSURE: 71 MMHG | OXYGEN SATURATION: 94 % | BODY MASS INDEX: 32.76 KG/M2 | HEART RATE: 74 BPM | HEIGHT: 63 IN | TEMPERATURE: 97.9 F

## 2017-08-19 PROCEDURE — 94799 UNLISTED PULMONARY SVC/PX: CPT

## 2017-08-19 PROCEDURE — 97110 THERAPEUTIC EXERCISES: CPT

## 2017-08-19 PROCEDURE — 97150 GROUP THERAPEUTIC PROCEDURES: CPT

## 2017-08-19 PROCEDURE — 94640 AIRWAY INHALATION TREATMENT: CPT

## 2017-08-19 RX ORDER — HYDROCODONE BITARTRATE AND ACETAMINOPHEN 7.5; 325 MG/1; MG/1
1-2 TABLET ORAL EVERY 4 HOURS PRN
Qty: 100 TABLET | Refills: 0 | Status: SHIPPED | OUTPATIENT
Start: 2017-08-19

## 2017-08-19 RX ADMIN — NYSTATIN 500000 UNITS: 100000 SUSPENSION ORAL at 12:15

## 2017-08-19 RX ADMIN — METOPROLOL SUCCINATE 100 MG: 100 TABLET, FILM COATED, EXTENDED RELEASE ORAL at 09:03

## 2017-08-19 RX ADMIN — CLONAZEPAM 0.75 MG: 0.5 TABLET ORAL at 09:03

## 2017-08-19 RX ADMIN — DOCUSATE SODIUM -SENNOSIDES 2 TABLET: 50; 8.6 TABLET, COATED ORAL at 09:03

## 2017-08-19 RX ADMIN — BUDESONIDE AND FORMOTEROL FUMARATE DIHYDRATE 2 PUFF: 80; 4.5 AEROSOL RESPIRATORY (INHALATION) at 09:16

## 2017-08-19 RX ADMIN — PANTOPRAZOLE SODIUM 40 MG: 40 TABLET, DELAYED RELEASE ORAL at 05:55

## 2017-08-19 RX ADMIN — ALBUTEROL SULFATE 2.5 MG: 2.5 SOLUTION RESPIRATORY (INHALATION) at 07:33

## 2017-08-19 RX ADMIN — FUROSEMIDE 20 MG: 20 TABLET ORAL at 09:03

## 2017-08-19 RX ADMIN — HYDROCODONE BITARTRATE AND ACETAMINOPHEN 2 TABLET: 7.5; 325 TABLET ORAL at 14:01

## 2017-08-19 RX ADMIN — HYDROCODONE BITARTRATE AND ACETAMINOPHEN 2 TABLET: 7.5; 325 TABLET ORAL at 10:03

## 2017-08-19 RX ADMIN — FERROUS SULFATE TAB 325 MG (65 MG ELEMENTAL FE) 325 MG: 325 (65 FE) TAB at 09:03

## 2017-08-19 RX ADMIN — HYDROCODONE BITARTRATE AND ACETAMINOPHEN 2 TABLET: 7.5; 325 TABLET ORAL at 01:59

## 2017-08-19 RX ADMIN — HYDROCODONE BITARTRATE AND ACETAMINOPHEN 2 TABLET: 7.5; 325 TABLET ORAL at 05:55

## 2017-08-19 RX ADMIN — HYDROCODONE BITARTRATE AND ACETAMINOPHEN 2 TABLET: 7.5; 325 TABLET ORAL at 17:01

## 2017-08-19 RX ADMIN — TIOTROPIUM BROMIDE 1 CAPSULE: 18 CAPSULE ORAL; RESPIRATORY (INHALATION) at 09:16

## 2017-08-19 NOTE — PLAN OF CARE
Problem: Patient Care Overview (Adult)  Goal: Plan of Care Review  Outcome: Ongoing (interventions implemented as appropriate)    08/19/17 0257   Coping/Psychosocial Response Interventions   Plan Of Care Reviewed With patient   Patient Care Overview   Progress improving   Outcome Evaluation   Outcome Summary/Follow up Plan htn well controlled. pain controlled. amb well with assist.         Problem: Fall Risk (Adult)  Goal: Absence of Falls  Outcome: Ongoing (interventions implemented as appropriate)    08/19/17 0257   Fall Risk (Adult)   Absence of Falls making progress toward outcome

## 2017-08-19 NOTE — SIGNIFICANT NOTE
08/19/17 1558   PT Discharge Summary   Reason for Discharge Discharge from facility   Outcomes Achieved Patient able to partially acheive established goals

## 2017-08-19 NOTE — PLAN OF CARE
Problem: Patient Care Overview (Adult)  Goal: Plan of Care Review  Outcome: Ongoing (interventions implemented as appropriate)    08/19/17 7481   Coping/Psychosocial Response Interventions   Plan Of Care Reviewed With patient   Patient Care Overview   Progress progress toward functional goals as expected

## 2017-08-19 NOTE — THERAPY DISCHARGE NOTE
Acute Care - Physical Therapy Treatment Note/Discharge  [facility]     Patient Name: Marylu Lunsford  : 1945  MRN: [id number]  Today's Date: 2017  Onset of Illness/Injury or Date of Surgery Date: 17  Date of Referral to PT: 17  Referring Physician: Julian Brown    Admit Date: 2017    Visit Dx:  No diagnosis found.  Patient Active Problem List   Diagnosis   • Severe sepsis   • Atrial fibrillation   • COPD (chronic obstructive pulmonary disease)   • Venous insufficiency (chronic) (peripheral)   • Pneumonia of right upper lobe due to infectious organism   • Syncope   • Abnormal TSH   • DNR (do not resuscitate)   • Coronavirus infection   • Osteoarthritis, knee       Physical Therapy Education     Title: PT OT SLP Therapies (Done)     Topic: Physical Therapy (Done)     Point: Mobility training (Done)    Learning Progress Summary    Learner Readiness Method Response Comment Documented by Status   Patient Acceptance E VU,DU   17 1558 Done    Acceptance E,TB,D VU,NR   17 1603 Done    Acceptance E VU  MA 17 1543 Done    Acceptance E,TB,D NR,VU   17 1606 Done    Acceptance E,TB,D VU,NR   17 1141 Done    Acceptance E,D VU,NR  MS 08/15/17 0857 Done               Point: Home exercise program (Done)    Learning Progress Summary    Learner Readiness Method Response Comment Documented by Status   Patient Acceptance E VU,DU   17 1558 Done    Acceptance E,TB,D VU,NR   17 1603 Done    Acceptance E VU  MA 17 1543 Done    Acceptance E,TB,D NR,VU   17 1606 Done    Acceptance E,TB,D VU,NR   17 1141 Done    Acceptance E,D VU,NR  MS 08/15/17 0857 Done               Point: Body mechanics (Done)    Learning Progress Summary    Learner Readiness Method Response Comment Documented by Status   Patient Acceptance E,TB,D VU,NR   17 1603 Done    Acceptance E VU  MA 17 1543 Done    Acceptance E,TB,D NR,VU   17 1606  Done    Acceptance E,TB,D VU,NR   08/16/17 1141 Done    Acceptance E,D VU,NR  MS 08/15/17 0857 Done               Point: Precautions (Done)    Learning Progress Summary    Learner Readiness Method Response Comment Documented by Status   Patient Acceptance E,TB,D VU,NR   08/18/17 1603 Done    Acceptance E VU  MA 08/17/17 1543 Done    Acceptance E,TB,D NR,VU   08/16/17 1606 Done    Acceptance E,TB,D VU,NR   08/16/17 1141 Done    Acceptance E,D VU,NR  MS 08/15/17 0857 Done                      User Key     Initials Effective Dates Name Provider Type Discipline     10/06/15 -  Loly Jacobo, PT Physical Therapist PT     02/18/16 -  Kelsey Harkins, PTA Physical Therapy Assistant PT    MS 12/01/15 -  Fransisco Chase, PT Physical Therapist PT     04/06/16 -  Maria Elena Diego, PTA Physical Therapy Assistant PT    MA 12/13/16 -  Ketty Dao, PT Physical Therapist PT                    IP PT Goals       08/15/17 0858          Bed Mobility PT LTG    Bed Mobility PT LTG, Date Established 08/15/17  -MS      Bed Mobility PT LTG, Time to Achieve 3 days  -MS      Bed Mobility PT LTG, Activity Type all bed mobility  -MS      Bed Mobility PT LTG, [place] Level supervision required  -MS      Transfer Training PT LTG    Transfer Training PT LTG, Date Established 08/15/17  -MS      Transfer Training PT LTG, Time to Achieve 3 days  -MS      Transfer Training PT LTG, Activity Type all transfers  -MS      Transfer Training PT LTG, [place] Level supervision required  -MS      Transfer Training PT LTG, Assist Device walker, rolling  -MS      Gait Training PT LTG    Gait Training Goal PT LTG, Date Established 08/15/17  -MS      Gait Training Goal PT LTG, Time to Achieve 3 days  -MS      Gait Training Goal PT LTG, [place] Level supervision required  -MS      Gait Training Goal PT LTG, Assist Device walker, rolling  -MS      Gait Training Goal PT LTG, Distance to Achieve 100 feet  -MS      Range of Motion  PT LTG    Range of Motion Goal PT LTG, Date Established 08/15/17  -MS      Range of Motion Goal PT LTG, Time to Achieve 3 days  -MS      Range fo Motion Goal PT LTG, Joint L knee  -MS      Range of Motion Goal PT LTG, AROM Measure (5, 85)  -MS        User Key  (r) = Recorded By, (t) = Taken By, (c) = Cosigned By    Initials Name Provider Type    MS Fransisco Storeyyder, PT Physical Therapist              Adult Rehabilitation Note       08/19/17 1030 08/18/17 1556 08/17/17 1539    Rehab Assessment/Intervention    Discipline physical therapist  -PIA physical therapy assistant  - physical therapist  -MA    Document Type therapy note (daily note);discharge summary  -PIA therapy note (daily note)  - therapy note (daily note)  -MA    Subjective Information agree to therapy  - agree to therapy;complains of;fatigue;pain;swelling  - agree to therapy;complains of;pain  -MA    Patient Effort, Rehab Treatment good  -PIA  adequate  -MA    Symptoms Noted Comment   Patient reports continued pain in L knee  -MA    Precautions/Limitations  fall precautions  - fall precautions  -MA    Recorded by [PIA] Loly Jacobo, PT [JM] Kelsey Harkins, PTA [MA] Ketty Dao, PT    Pain Assessment    Pain Assessment 0-10  -PIA 0-10  - 0-10  -MA    Pain Score 3  -PIA 9  -JM 10   patient reported 12/10 pain; no signs of acute distress note  -MA    Post Pain Score   10  -MA    Pain Type  Surgical pain  - Acute pain  -MA    Pain Location Knee  -PIA Knee  -JM Knee  -MA    Pain Orientation Left  -PIA Left  - Left  -MA    Pain Intervention(s)  Medication (See MAR);Repositioned  - Medication (See MAR);Repositioned  -MA    Response to Interventions  jane  - tolerated  -MA    Recorded by [PIA] Loly Jacobo, PT [JM] Kelsey Harkins, PTA [MA] Ketty Dao, PT    Cognitive Assessment/Intervention    Current Cognitive/Communication Assessment impaired  -PIA  impaired  -MA    Orientation Status oriented to;person;place;situation  -PIA   oriented x 4  -MA    Follows Commands/Answers Questions   100% of the time;able to follow single-step instructions;needs cueing;needs increased time;needs repetition  -MA    Personal Safety mild impairment  -PIA  mild impairment;decreased awareness, need for safety;decreased awareness, need for assist  -MA    Personal Safety Interventions fall prevention program maintained;gait belt;muscle strengthening facilitated;nonskid shoes/slippers when out of bed  -PIA  fall prevention program maintained;gait belt;nonskid shoes/slippers when out of bed  -MA    Recorded by [PIA] Loly Jacobo, PT  [MA] Ketty Dao, PT    ROM (Range of Motion)    General ROM Detail  L knee -4-85  -     Recorded by  [JM] Kelsey Harkins PTA     Mobility Assessment/Training    Extremity Weight-Bearing Status   left lower extremity  -MA    Left Lower Extremity Weight-Bearing   weight-bearing as tolerated  -MA    Recorded by   [MA] Ketty Dao, PT    Bed Mobility, Assessment/Treatment    Bed Mobility, Assistive Device bed rails;head of bed elevated  -      Bed Mobility, Scoot/Bridge, [place]  contact guard assist  -     Bed Mob, Supine to Sit, [place] contact guard assist;verbal cues required  -PIA  not tested  -MA    Bed Mob, Sit to Supine, [place] contact guard assist;verbal cues required  -PIA  not tested  -MA    Bed Mobility, Comment  in chair  - Patient up in chair  -MA    Recorded by [PIA] Loly Jacobo, PT [JM] Kelsey Harkins, PTA [MA] Ketty Dao, PT    Transfer Assessment/Treatment    Transfers, Sit-Stand [place] contact guard assist;verbal cues required  - contact guard assist;verbal cues required  - contact guard assist;verbal cues required  -MA    Transfers, Stand-Sit [place] contact guard assist;verbal cues required  - supervision required;verbal cues required  - contact guard assist;verbal cues required  -MA    Transfers, Sit-Stand-Sit, Assist Device rolling walker  -PIA  rolling walker  -JM rolling walker  -MA    Transfer, Safety Issues   sequencing ability decreased  -MA    Transfer, Impairments  pain;strength decreased;impaired balance  -     Recorded by [PIA] Loly Jacobo, PT [JM] Kelsey Harkins PTA [MA] Ketty Dao, PT    Gait Assessment/Treatment    Gait, [place] Level contact guard assist;verbal cues required  -PIA contact guard assist;verbal cues required  - contact guard assist;verbal cues required  -MA    Gait, Assistive Device rolling walker  -PIA rolling walker  -JM rolling walker  -MA    Gait, Distance (Feet) 75  -  -  -MA    Gait, Gait Pattern Analysis   swing-through gait  -MA    Gait, Gait Deviations antalgic;leeanna decreased;forward flexed posture  -PIA forward flexed posture;step length decreased;stride width increased  - leeanna decreased;forward flexed posture;antalgic  -MA    Gait, Safety Issues  step length decreased;weight-shifting ability decreased;balance decreased during turns  - step length decreased  -MA    Gait, Impairments  decreased flexibility;impaired balance;pain  -     Gait, Comment  pain limiting dist and urgency for BR  -JM     Recorded by [PIA] Loly Jacobo, PT [JM] Kelsey Harkins, PTA [MA] Ketty Dao, PT    Therapy Exercises    Exercise Protocols total knee  -PIA total knee  -JM total knee  -MA    Total Knee Exercises left:;30 reps;completed protocol  -PIA left:;30 reps;completed protocol   cues for each exer  -JM 30 reps;completed protocol;with assist;SLR  -MA    Recorded by [PIA] Loly Jacobo, PT [JM] Kelsey Harkins PTA [MA] Ketty Dao, PT    Positioning and Restraints    Pre-Treatment Position sitting in chair/recliner  -PIA sitting in chair/recliner  - sitting in chair/recliner  -MA    Post Treatment Position chair  -PIA  bathroom  -MA    In Chair reclined;call light within reach  -PIA  --  -MA    Bathroom  with nsg  -JM --   with nursing staff  -MA    Recorded by [PIA] Loly FISHER  [place], PT [JM] Kelsey Harkins, PTA [MA] Ketty Dao, PT      08/17/17 1132          Rehab Assessment/Intervention    Discipline physical therapist  -MA      Document Type therapy note (daily note)  -MA      Subjective Information agree to therapy;complains of;pain  -MA      Patient Effort, Rehab Treatment good  -MA      Symptoms Noted Comment Patient reports pain in L knee and fatigue  -MA      Precautions/Limitations fall precautions  -MA      Recorded by [MA] Ketty Dao, PT      Pain Assessment    Pain Assessment 0-10  -MA      Pain Score 9  -MA      Post Pain Score 5  -MA      Pain Type Acute pain;Surgical pain  -MA      Pain Location Knee  -MA      Pain Orientation Left  -MA      Pain Intervention(s) Medication (See MAR);Repositioned;Ambulation/increased activity  -MA      Response to Interventions tolerated  -MA      Recorded by [MA] Ketty Dao, PT      Cognitive Assessment/Intervention    Current Cognitive/Communication Assessment impaired  -MA      Orientation Status oriented x 4  -MA      Follows Commands/Answers Questions 100% of the time;able to follow single-step instructions;needs cueing;needs increased time;needs repetition  -MA      Personal Safety mild impairment;decreased awareness, need for safety;decreased awareness, need for assist  -MA      Personal Safety Interventions fall prevention program maintained;gait belt;nonskid shoes/slippers when out of bed  -MA      Recorded by [MA] Ketty Dao, PT      ROM (Range of Motion)    General ROM Detail L knee 9-90'  -MA      Recorded by [MA] Ketty Dao PT      Mobility Assessment/Training    Extremity Weight-Bearing Status left lower extremity  -MA      Left Lower Extremity Weight-Bearing weight-bearing as tolerated  -MA      Recorded by [MA] Ketty Dao, PT      Bed Mobility, Assessment/Treatment    Bed Mob, Supine to Sit, [place] not tested  -MA      Bed Mob, Sit to Supine, [place] not tested  -MA       Bed Mobility, Comment Patient up in chair  -MA      Recorded by [MA] Ketty Dao PT      Transfer Assessment/Treatment    Transfers, Sit-Stand [place] contact guard assist;verbal cues required  -MA      Transfers, Stand-Sit [place] contact guard assist;verbal cues required  -MA      Transfers, Sit-Stand-Sit, Assist Device rolling walker  -MA      Transfer, Safety Issues sequencing ability decreased;step length decreased  -MA      Recorded by [MA] Ketty Dao PT      Gait Assessment/Treatment    Gait, [place] Level contact guard assist;verbal cues required  -MA      Gait, Assistive Device rolling walker  -MA      Gait, Distance (Feet) 125  -MA      Gait, Gait Pattern Analysis other (see comments)   improved swing-through gait with increased ambulation  -MA      Gait, Gait Deviations ataxia;forward flexed posture;leeanna decreased  -MA      Gait, Safety Issues step length decreased  -MA      Recorded by [MA] Ketty Dao PT      Therapy Exercises    Exercise Protocols total knee  -MA      Total Knee Exercises left:;30 reps;completed protocol  -MA      Recorded by [MA] Ketty Dao PT      Positioning and Restraints    Pre-Treatment Position sitting in chair/recliner  -MA      Post Treatment Position chair  -MA      In Chair reclined;call light within reach;encouraged to call for assist;legs elevated  -MA      Recorded by [MA] Ketty Dao PT        User Key  (r) = Recorded By, (t) = Taken By, (c) = Cosigned By    Initials Name Effective Dates    PIA Jacobo, PT 10/06/15 -     ZEYAD Harkins, PTA 02/18/16 -     JUSTIN Dao, PT 12/13/16 -           PT Recommendation and Plan  Anticipated Discharge Disposition: skilled nursing facility  Planned Therapy Interventions: balance training, bed mobility training, gait training, home exercise program, patient/family education, postural re-education, ROM (Range of Motion), strengthening, transfer  training  PT Frequency: 2 times/day  Plan of Care Review  Plan Of Care Reviewed With: patient  Progress: progress toward functional goals as expected          Outcome Measures       08/19/17 1500 08/18/17 1600 08/17/17 1100    How much help from another person do you currently need...    Turning from your back to your side while in flat bed without using bedrails? 4  -PIA 4  -JM 4  -MA    Moving from lying on back to sitting on the side of a flat bed without bedrails? 3  -PIA 3  -JM 4  -MA    Moving to and from a bed to a chair (including a wheelchair)? 3  -PIA 3  -JM 3  -MA    Standing up from a chair using your arms (e.g., wheelchair, bedside chair)? 3  -PIA 3  -JM 3  -MA    Climbing 3-5 steps with a railing? 3  -PIA 2  -JM 3  -MA    To walk in hospital room? 3  -PIA 3  -JM 3  -MA    AM-PAC 6 Clicks Score 19  -PIA 18  -JM 20  -MA    Functional Assessment    Outcome Measure Options AM-PAC 6 Clicks Basic Mobility (PT)  -PIA  AM-PAC 6 Clicks Basic Mobility (PT)  -MA      User Key  (r) = Recorded By, (t) = Taken By, (c) = Cosigned By    Initials Name Provider Type    PIA Jacobo, PT Physical Therapist    ZEYAD Harkins PTA Physical Therapy Assistant    JUSTIN Dao, PT Physical Therapist           Time Calculation:         PT Charges       08/19/17 1558          Time Calculation    Start Time 1030  -PIA      Stop Time 1130  -PIA      Time Calculation (min) 60 min  -PIA      PT Received On 08/19/17  -PIA        User Key  (r) = Recorded By, (t) = Taken By, (c) = Cosigned By    Initials Name Provider Type    PIA Jacobo, PT Physical Therapist          Therapy Charges for Today     Code Description Service Date Service Provider Modifiers Qty    55881767503 HC PT THER PROC GROUP 8/19/2017 Loly Jacobo, PT GP 1    13050196564 HC PT THER PROC EA 15 MIN 8/19/2017 Loly Jacobo, PT GP 1          PT G-Codes  Outcome Measure Options: AM-PAC 6 Clicks Basic Mobility (PT)    PT Discharge Summary  Reason for  Discharge: Discharge from facility  Outcomes Achieved: Patient able to partially acheive established goals    Loly Jacobo, PT  8/19/2017

## 2017-08-19 NOTE — PROGRESS NOTES
"/84 (BP Location: Right arm, Patient Position: Lying)  Pulse 91  Temp 98.9 °F (37.2 °C) (Oral)   Resp 18  Ht 63\" (160 cm)  Wt 184 lb 14.4 oz (83.9 kg)  SpO2 94%  BMI 32.75 kg/m2    Lab Results (last 24 hours)     ** No results found for the last 24 hours. **          Imaging Results (last 24 hours)     ** No results found for the last 24 hours. **          Patient Care Team:  Joyce Mark MD as PCP - General (Family Medicine)  Christopher Wells MD as Consulting Physician (Cardiology)  Jannette De La Cruz MD as Consulting Physician (Pulmonary Disease)    SUBJECTIVE  Doing better  PHYSICAL EXAM   Leg looks better  Less edema and erythema  Active Problems:    Osteoarthritis, knee  AF    PLAN / DISPOSITION:  Rehab discharge today  Julian Oshea MD  08/19/17  7:01 AM      "

## 2017-08-19 NOTE — DISCHARGE SUMMARY
Discharge Summary   Julian Oshea M.D.    NAME: Marylu Lunsford ADMIT: 2017   : 1945  PCP: Joyce Mark MD    MRN: [id number] LOS: 5 days   AGE/SEX: 72 y.o. female  ROOM: 80       Date of Discharge:  17    Primary Discharge Diagnosis:  Osteoarthritis, knee [M17.9]    Secondary Discharge Diagnosis:    Problem List Items Addressed This Visit     None          Procedures Performed:  Left Total Knee Arthroplasty    Hospital Course:    Marylu Lunsford is a 72 y.o.  female who underwent successful Left Total Knee Arthroplasty on 2017.  Marylu Lunsford was started on Aspirin 325mg po daily immediately post-operatively for DVT prophylaxis.  This was ultimately changed to Xarelto due to the onset of AF (see cardiology notes)  On post-op day 1 the patients dressing was changed, drain removed and their incision was clean, with no signs of infection and their calf was soft, with no signs of DVT.  The patient progressed well with physical therapy and the patients hemoglobin remained stable. On post-operative day 5 the patient was felt ready for discharge.      Total Knee Joint Replacement Discharge Instructions:    I. ACTIVITIES:    1. Exercises:  ? Complete exercise program as taught by the hospital physical therapist 2 times per day  ? Exercise program will be advanced by the physical therapist  ? During the day be up ambulating every 2 hours (while awake) for short distances  ? Complete the ankle pump exercises at least 10 times per hour (while awake)  ? Elevate legs most of the day the first week post operatively and thereafter elevate legs when in bed and for at least 30 minutes during the day. Caution must be taken to avoid pillow placement under the bend of the knee as this can led to flexion contractures of the knee.  ? Use cold packs 20-30 minutes approximately 5 times per day. This should be done before and after completing your exercises and at any time you are experiencing pain/  stiffness in your operative extremity.    2. Activities of Daily Living:  ? No tub baths, hot tubs, or swimming pools for 4 weeks  ? May shower and let water run over the incision on post-operative day #7 if no drainage. Do not scrub or rub the incision. Simply let the water run over the incision and pat dry.    II. Precautions:  ? Everyone that comes near you should wash their hands  ? No elective dental, genital-urinary, or colon procedures or surgical procedures for 12 weeks after surgery unless absolutely necessary.  ? If dental work or surgical procedure is deemed absolutely necessary during the first 12 weeks, you will need to contact your surgeon as you will need to take antibiotics 1 hour prior to any dental work (including teeth cleanings).  ? Please discuss with your surgeon prophylactic antibiotics as the length of time this intervention will be necessary for you varies with each patient’s health history and situation.  ? Avoid sick people. If you must be around someone who is ill, they should wear a mask.  ? Avoid visits to the Emergency Room or Urgent Care unless you are having a life threatening event.     III. INCISION CARE:  ? Wash your hands prior to dressing changes  ? Change the dressing as needed to keep incision clean and dry. Utilize dry gauze and paper tape. Avoid touching the side of the gauze that goes against the incision with your hands.  ? No creams or ointments to the incision  ? May remove dressing once the incision is free of drainage  ? Do not touch or pick at the incision  ? Check incision every day and notify surgeon immediately if any of the following signs or symptoms are noted:  o Increase in redness  o Increase in swelling around the incision and of the entire extremity  o Increase in pain  o Drainage oozing from the incision  o Pulling apart of the edges of the incision  o Increase in overall body temperature (greater than 100.5 degrees)  ? Your surgeon will instruct you  regarding suture or staple removal    IV. Medications:     1. Anticoagulants: You will be discharged on an anticoagulant. This is a prophylactic medication that helps prevent blood clots during your post-operative period. The type and length of dosage varies based on your individual needs, procedure performed, and surgeon’s preference.    ? While taking the anticoagulant, you should avoid taking any additional aspirin, ibuprofen (Advil or Motrin), Aleve (Naprosyn) or other non-steroidal anti-inflammatory medications.   ? Notify surgeon immediately if any vj bleeding is noted in the urine, stool, emesis, or from the nose or the incision. Blood in the stool will often appear as black rather than red. Blood in urine may appear as pink. Blood in emesis may appear as brown/black like coffee grounds.  ? You will need to apply pressure for longer periods of time to any cuts or abrasions to stop bleeding  ? Avoid alcohol while taking anticoagulants    2. Stool Softeners: You will be at greater risk of constipation after surgery due to being less mobile and the pain medications.     ? Take stool softeners as instructed by your surgeon while on pain medications. Bran cereal is most effective. Over the counter Colace 100 mg 1-2 capsules twice daily.   ? Drink plenty of fluids, and eat fruits and vegetables during your recovery time    3. Pain Medications utilized after surgery are narcotics and the law requires that the following information be given to all patients that are prescribed narcotics:    ? CLASSIFICATION: Pain medications are called Opioids and are narcotics  ? LEGALITIES: It is illegal to share narcotics with others and to drive within 24 hours of taking narcotics  ? POTENTIAL SIDE EFFECTS: Potential side effects of opioids include: nausea, vomiting, itching, dizziness, drowsiness, dry mouth, constipation, and difficulty urinating.  ? POTENTIAL ADVERSE EFFECTS:   o Opioid tolerance can develop with use of pain  medications and this simply means that it requires more and more of the medication to control pain; however, this is seen more in patients that use opioids for longer periods of time.  o Opioid dependence can develop with use of Opioids and this simply means that to stop the medication can cause withdrawal symptoms; however, this is seen with patients that use Opioids for longer periods of time.  o Opioid addiction can develop with use of Opioids and the incidence of this is very unlikely in patients who take the medications as ordered and stop the medications as instructed.  o Opioid overdose can be dangerous, but is unlikely when the medication is taken as ordered and stopped when ordered. It is important not to mix opioids with alcohol or with and type of sedative such as Benadryl as this can lead to over sedation and respiratory difficulty.  ? DOSAGE:   o Pain medications will need to be taken consistently for the first week to decrease pain and promote adequate pain relief and participation in physical therapy.  o After the initial surgical pain begins to resolve, you may begin to decrease the pain medication. By the end of 6-8 weeks, you should be off of pain medications.  o Refills will not be given by the office during evening hours, on weekends, or after 6-8 weeks post-op.  o To seek refills on pain medications during the initial 6 week post-operative period, you must call the office 48 hours in advance to request the refill. The office will then notify you when to  the prescription. DO NOT wait until you are out of the medication to request a refill.    V. FOLLOW-UP VISITS:  ? You will need to follow up in the office with your surgeon in 3 weeks. Please call this number [phone] to schedule this appointment.  If you have any concerns or suspected complications prior to your follow up visit, please call your surgeons office. Do not wait until your appointment time if you suspect complications.  These will need to be addressed in the office promptly.    Discharge Medications:     1) hydrocodone 7.5/325  1-2 po q 4-6 hours for pain control  2)  Aspirin 325 mg po daily for 6 weeks.      Julian Oshea MD  8/19/2017  7:04 AM

## 2019-04-08 ENCOUNTER — TRANSCRIBE ORDERS (OUTPATIENT)
Dept: ADMINISTRATIVE | Facility: HOSPITAL | Age: 74
End: 2019-04-08

## 2019-04-08 DIAGNOSIS — M25.552 LEFT HIP PAIN: Primary | ICD-10-CM

## 2019-04-17 ENCOUNTER — LAB (OUTPATIENT)
Dept: LAB | Facility: HOSPITAL | Age: 74
End: 2019-04-17

## 2019-04-17 ENCOUNTER — HOSPITAL ENCOUNTER (OUTPATIENT)
Dept: NUCLEAR MEDICINE | Facility: HOSPITAL | Age: 74
Discharge: HOME | End: 2019-04-17

## 2019-04-17 ENCOUNTER — TRANSCRIBE ORDERS (OUTPATIENT)
Dept: ADMINISTRATIVE | Facility: HOSPITAL | Age: 74
End: 2019-04-17

## 2019-04-17 ENCOUNTER — HOSPITAL ENCOUNTER (OUTPATIENT)
Dept: ULTRASOUND IMAGING | Facility: HOSPITAL | Age: 74
Discharge: HOME | End: 2019-04-17
Admitting: ORTHOPAEDIC SURGERY

## 2019-04-17 DIAGNOSIS — M25.552 LEFT HIP PAIN: ICD-10-CM

## 2019-04-17 DIAGNOSIS — I82.90 THROMBUS: ICD-10-CM

## 2019-04-17 DIAGNOSIS — I82.90 THROMBUS: Primary | ICD-10-CM

## 2019-04-17 PROCEDURE — A9503 TC99M MEDRONATE: HCPCS | Performed by: ORTHOPAEDIC SURGERY

## 2019-04-17 PROCEDURE — 0 TECHNETIUM MEDRONATE KIT: Performed by: ORTHOPAEDIC SURGERY

## 2019-04-17 PROCEDURE — 78306 BONE IMAGING WHOLE BODY: CPT

## 2019-04-17 PROCEDURE — 83018 HEAVY METAL QUAN EACH NES: CPT

## 2019-04-17 PROCEDURE — 76882 US LMTD JT/FCL EVL NVASC XTR: CPT

## 2019-04-17 PROCEDURE — 82495 ASSAY OF CHROMIUM: CPT

## 2019-04-17 PROCEDURE — 36415 COLL VENOUS BLD VENIPUNCTURE: CPT

## 2019-04-17 RX ORDER — TC 99M MEDRONATE 20 MG/10ML
20.8 INJECTION, POWDER, LYOPHILIZED, FOR SOLUTION INTRAVENOUS
Status: COMPLETED | OUTPATIENT
Start: 2019-04-17 | End: 2019-04-17

## 2019-04-17 RX ADMIN — Medication 20.8 MILLICURIE: at 12:26

## 2019-04-19 LAB
COBALT SERPL-MCNC: 8.5 UG/L (ref 0–0.9)
CR SERPL-MCNC: 4.6 UG/L (ref 0.1–2.1)

## 2019-06-07 ENCOUNTER — HOSPITAL ENCOUNTER (EMERGENCY)
Facility: HOSPITAL | Age: 74
Discharge: HOME | End: 2019-06-07
Attending: EMERGENCY MEDICINE | Admitting: EMERGENCY MEDICINE

## 2019-06-07 ENCOUNTER — APPOINTMENT (OUTPATIENT)
Dept: GENERAL RADIOLOGY | Facility: HOSPITAL | Age: 74
End: 2019-06-07

## 2019-06-07 VITALS
DIASTOLIC BLOOD PRESSURE: 85 MMHG | OXYGEN SATURATION: 94 % | HEIGHT: 64 IN | WEIGHT: 195 LBS | SYSTOLIC BLOOD PRESSURE: 113 MMHG | BODY MASS INDEX: 33.29 KG/M2 | RESPIRATION RATE: 16 BRPM | HEART RATE: 75 BPM | TEMPERATURE: 98 F

## 2019-06-07 DIAGNOSIS — T50.905A ADVERSE EFFECT OF DRUG, INITIAL ENCOUNTER: Primary | ICD-10-CM

## 2019-06-07 LAB
ALBUMIN SERPL-MCNC: 3.9 G/DL (ref 3.5–5.2)
ALBUMIN/GLOB SERPL: 1.6 G/DL
ALP SERPL-CCNC: 71 U/L (ref 39–117)
ALT SERPL W P-5'-P-CCNC: 11 U/L (ref 1–33)
ANION GAP SERPL CALCULATED.3IONS-SCNC: 9.9 MMOL/L
AST SERPL-CCNC: 16 U/L (ref 1–32)
BASOPHILS # BLD AUTO: 0.03 10*3/MM3 (ref 0–0.2)
BASOPHILS NFR BLD AUTO: 0.6 % (ref 0–1.5)
BILIRUB SERPL-MCNC: 0.7 MG/DL (ref 0.2–1.2)
BUN BLD-MCNC: 13 MG/DL (ref 8–23)
BUN/CREAT SERPL: 13.1 (ref 7–25)
CALCIUM SPEC-SCNC: 9.2 MG/DL (ref 8.6–10.5)
CHLORIDE SERPL-SCNC: 99 MMOL/L (ref 98–107)
CO2 SERPL-SCNC: 28.1 MMOL/L (ref 22–29)
CREAT BLD-MCNC: 0.99 MG/DL (ref 0.57–1)
DEPRECATED RDW RBC AUTO: 48.6 FL (ref 37–54)
EOSINOPHIL # BLD AUTO: 0.12 10*3/MM3 (ref 0–0.4)
EOSINOPHIL NFR BLD AUTO: 2.5 % (ref 0.3–6.2)
ERYTHROCYTE [DISTWIDTH] IN BLOOD BY AUTOMATED COUNT: 12.8 % (ref 12.3–15.4)
GFR SERPL CREATININE-BSD FRML MDRD: 55 ML/MIN/1.73
GLOBULIN UR ELPH-MCNC: 2.4 GM/DL
GLUCOSE BLD-MCNC: 102 MG/DL (ref 65–99)
HCT VFR BLD AUTO: 36.5 % (ref 34–46.6)
HGB BLD-MCNC: 12.1 G/DL (ref 12–15.9)
IMM GRANULOCYTES # BLD AUTO: 0.01 10*3/MM3 (ref 0–0.05)
IMM GRANULOCYTES NFR BLD AUTO: 0.2 % (ref 0–0.5)
LYMPHOCYTES # BLD AUTO: 1.12 10*3/MM3 (ref 0.7–3.1)
LYMPHOCYTES NFR BLD AUTO: 23.7 % (ref 19.6–45.3)
MCH RBC QN AUTO: 34 PG (ref 26.6–33)
MCHC RBC AUTO-ENTMCNC: 33.2 G/DL (ref 31.5–35.7)
MCV RBC AUTO: 102.5 FL (ref 79–97)
MONOCYTES # BLD AUTO: 0.37 10*3/MM3 (ref 0.1–0.9)
MONOCYTES NFR BLD AUTO: 7.8 % (ref 5–12)
NEUTROPHILS # BLD AUTO: 3.08 10*3/MM3 (ref 1.7–7)
NEUTROPHILS NFR BLD AUTO: 65.2 % (ref 42.7–76)
NRBC BLD AUTO-RTO: 0 /100 WBC (ref 0–0.2)
PLATELET # BLD AUTO: 145 10*3/MM3 (ref 140–450)
PMV BLD AUTO: 10.2 FL (ref 6–12)
POTASSIUM BLD-SCNC: 4.2 MMOL/L (ref 3.5–5.2)
PROT SERPL-MCNC: 6.3 G/DL (ref 6–8.5)
RBC # BLD AUTO: 3.56 10*6/MM3 (ref 3.77–5.28)
SODIUM BLD-SCNC: 137 MMOL/L (ref 136–145)
TROPONIN T SERPL-MCNC: <0.01 NG/ML (ref 0–0.03)
WBC NRBC COR # BLD: 4.73 10*3/MM3 (ref 3.4–10.8)

## 2019-06-07 PROCEDURE — 99284 EMERGENCY DEPT VISIT MOD MDM: CPT

## 2019-06-07 PROCEDURE — 93005 ELECTROCARDIOGRAM TRACING: CPT | Performed by: EMERGENCY MEDICINE

## 2019-06-07 PROCEDURE — 71045 X-RAY EXAM CHEST 1 VIEW: CPT

## 2019-06-07 PROCEDURE — 85025 COMPLETE CBC W/AUTO DIFF WBC: CPT | Performed by: EMERGENCY MEDICINE

## 2019-06-07 PROCEDURE — 93010 ELECTROCARDIOGRAM REPORT: CPT | Performed by: INTERNAL MEDICINE

## 2019-06-07 PROCEDURE — 80053 COMPREHEN METABOLIC PANEL: CPT | Performed by: EMERGENCY MEDICINE

## 2019-06-07 PROCEDURE — 84484 ASSAY OF TROPONIN QUANT: CPT | Performed by: EMERGENCY MEDICINE

## 2019-06-07 PROCEDURE — 93005 ELECTROCARDIOGRAM TRACING: CPT

## 2019-06-07 RX ORDER — TELMISARTAN 40 MG/1
20 TABLET ORAL DAILY
COMMUNITY

## 2019-06-07 RX ORDER — ESCITALOPRAM OXALATE 10 MG/1
10 TABLET ORAL DAILY
COMMUNITY
End: 2019-06-07

## 2019-06-07 NOTE — ED TRIAGE NOTES
Pt reports she is tachycardic (144) and hypotensive (94/70). Pt had ekg done at PCP yesterday and was told it was abnormal. Pt was advised to come to ED yesterday, but went home instead. PCP called pt again today and told her to come to ED. Pt denies pain at this time.

## 2019-06-07 NOTE — ED PROVIDER NOTES
EMERGENCY DEPARTMENT ENCOUNTER    Room Number:  17/17  Date of encounter:  6/7/2019  PCP: Joyce Mark MD  Historian: Patient      HPI:  Chief Complaint: Referred by PCP  A complete HPI/ROS/PMH/PSH/SH/FH are unobtainable due to: Nothing    Context: Marylu Lunsford is a 73 y.o. female who presents to the ED c/o being referred to the ED by her primary care physician.  She saw her doctor yesterday in the office, and was found to be tachycardic and hypotensive.  This was on a routine visit to the office and she was feeling fine.  Her PCP advised her yesterday she should come emergently, but she declined and went home.  She called her back today to check on her and again urged her to come to the ER which is why she presented today.    She feels no symptoms and is in her normal state of health.  She was told yesterday that her heart rate was in the 140s, and her blood pressure was in the 80s.  She did not realize that by symptoms.  She was told that it might be an interaction between her antidepressant medications.  She takes both Cymbalta and Lexapro along with nortriptyline, and its felt that this was excessive SSRI dosing.  Last night she did not take any of her meds.      PAST MEDICAL HISTORY  Active Ambulatory Problems     Diagnosis Date Noted   • Severe sepsis (CMS/[facility]) 12/28/2016   • Atrial fibrillation (CMS/[facility]) 12/28/2016   • COPD (chronic obstructive pulmonary disease) (CMS/[facility]) 12/28/2016   • Venous insufficiency (chronic) (peripheral) 12/28/2016   • Pneumonia of right upper lobe due to infectious organism (CMS/[facility]) 12/28/2016   • Syncope 12/28/2016   • Abnormal TSH 12/29/2016   • DNR (do not resuscitate) 12/29/2016   • Coronavirus infection 01/01/2017   • Osteoarthritis, knee 08/14/2017     Resolved Ambulatory Problems     Diagnosis Date Noted   • No Resolved Ambulatory Problems     Past Medical History:   Diagnosis Date   • Anxiety    • Arthritis    • Chronic back pain    • COPD (chronic obstructive  pulmonary disease) (CMS/HCC)    • Depression    • History of cellulitis    • History of sepsis 2017   • History of transfusion    • Hyperlipidemia    • Hypertension    • Insomnia    • Lower back pain    • Pneumonia, bacterial 2017   • Presence of partial dental prosthetic device    • Sleep apnea    • Venous insufficiency (chronic) (peripheral) 12/28/2016         PAST SURGICAL HISTORY  Past Surgical History:   Procedure Laterality Date   • CRANIOTOMY      benign cyst on brainstem   • HIP ARTHROPLASTY Left    • [place] TOTAL KNEE ARTHROPLASTY Left 8/14/2017    Procedure: LT TOTAL KNEE ARTHROPLASTY;  Surgeon: Julian Oshea MD;  Location: [facility] OR;  Service: Orthopedics   • STAPEDECTOMY     • TEETH EXTRACTION      PARTIAL PLATE LOWER   • TOTAL HIP ARTHROPLASTY Right    • TOTAL HIP ARTHROPLASTY REVISION Right    • TUBAL ABDOMINAL LIGATION     • VEIN LIGATION AND STRIPPING Bilateral          FAMILY HISTORY  Family History   Problem Relation Age of Onset   • Lung cancer Mother    • COPD Father    • Malig Hyperthermia Neg Hx          SOCIAL HISTORY  Social History     Socioeconomic History   • Marital status:      Spouse name: Not on file   • Number of children: Not on file   • Years of education: Not on file   • Highest education level: Not on file   Tobacco Use   • Smoking status: Current Every Day Smoker     Packs/day: 0.50     Years: 50.00     Pack years: 25.00   • Smokeless tobacco: Never Used   Substance and Sexual Activity   • Alcohol use: No   • Drug use: Yes     Types: Hydrocodone   • Sexual activity: Defer         ALLERGIES  Doxycycline; Felodipine; and Nsaids        REVIEW OF SYSTEMS  Review of Systems   Constitutional: Negative.    HENT: Negative.    Respiratory: Negative.    Cardiovascular: Positive for palpitations.   Gastrointestinal: Negative.    Genitourinary: Negative.    Musculoskeletal: Negative.    Neurological: Negative.    Psychiatric/Behavioral: Negative.    All other systems reviewed and  are negative.           PHYSICAL EXAM    I have reviewed the triage vital signs and nursing notes.    GENERAL: well developed, well nourished in no apparent distress  HENT: NCAT, neck supple, trachea midline  EYES: no scleral icterus, PERRL, normal conjunctiva  CV: regular rhythm, regular rate, no murmur  RESPIRATORY: unlabored effort, CTAB  ABDOMEN: soft, non-tender, non-distended  MUSCULOSKELETAL: no gross deformity  NEURO: alert, CN II-XII grossly intact, sensory and motor function of extremities grossly intact.  SKIN: warm, dry, no rash  PSYCH:  Appropriate mood and affect    Vital signs and nursing notes reviewed.          LAB RESULTS  Recent Results (from the past 24 hour(s))   Comprehensive Metabolic Panel    Collection Time: 06/07/19  6:17 PM   Result Value Ref Range    Glucose 102 (H) 65 - 99 mg/dL    BUN 13 8 - 23 mg/dL    Creatinine 0.99 0.57 - 1.00 mg/dL    Sodium 137 136 - 145 mmol/L    Potassium 4.2 3.5 - 5.2 mmol/L    Chloride 99 98 - 107 mmol/L    CO2 28.1 22.0 - 29.0 mmol/L    Calcium 9.2 8.6 - 10.5 mg/dL    Total Protein 6.3 6.0 - 8.5 g/dL    Albumin 3.90 3.50 - 5.20 g/dL    ALT (SGPT) 11 1 - 33 U/L    AST (SGOT) 16 1 - 32 U/L    Alkaline Phosphatase 71 39 - 117 U/L    Total Bilirubin 0.7 0.2 - 1.2 mg/dL    eGFR Non African Amer 55 (L) >60 mL/min/1.73    Globulin 2.4 gm/dL    A/G Ratio 1.6 g/dL    BUN/Creatinine Ratio 13.1 7.0 - 25.0    Anion Gap 9.9 mmol/L   Troponin    Collection Time: 06/07/19  6:17 PM   Result Value Ref Range    Troponin T <0.010 0.000 - 0.030 ng/mL   CBC Auto Differential    Collection Time: 06/07/19  6:17 PM   Result Value Ref Range    WBC 4.73 3.40 - 10.80 10*3/mm3    RBC 3.56 (L) 3.77 - 5.28 10*6/mm3    Hemoglobin 12.1 12.0 - 15.9 g/dL    Hematocrit 36.5 34.0 - 46.6 %    .5 (H) 79.0 - 97.0 fL    MCH 34.0 (H) 26.6 - 33.0 pg    MCHC 33.2 31.5 - 35.7 g/dL    RDW 12.8 12.3 - 15.4 %    RDW-SD 48.6 37.0 - 54.0 fl    MPV 10.2 6.0 - 12.0 fL    Platelets 145 140 - 450  10*3/mm3    Neutrophil % 65.2 42.7 - 76.0 %    Lymphocyte % 23.7 19.6 - 45.3 %    Monocyte % 7.8 5.0 - 12.0 %    Eosinophil % 2.5 0.3 - 6.2 %    Basophil % 0.6 0.0 - 1.5 %    Immature Grans % 0.2 0.0 - 0.5 %    Neutrophils, Absolute 3.08 1.70 - 7.00 10*3/mm3    Lymphocytes, Absolute 1.12 0.70 - 3.10 10*3/mm3    Monocytes, Absolute 0.37 0.10 - 0.90 10*3/mm3    Eosinophils, Absolute 0.12 0.00 - 0.40 10*3/mm3    Basophils, Absolute 0.03 0.00 - 0.20 10*3/mm3    Immature Grans, Absolute 0.01 0.00 - 0.05 10*3/mm3    nRBC 0.0 0.0 - 0.2 /100 WBC       Ordered the above labs and reviewed the results.        RADIOLOGY  Xr Chest 1 View    Result Date: 6/7/2019  PORTABLE CHEST X-RAY  HISTORY: Heart palpitations and tachycardia.  Portable chest x-ray is provided. COMPARISON: Chest x-ray 08/01/2017.  FINDINGS: The cardiomediastinal silhouette is normal. The lungs are clear. The costophrenic sulci are dry and the bones appear normal. There is no pneumothorax.      Negative.  This report was finalized on 6/7/2019 6:49 PM by Dr. Chris Almanzar M.D.        I ordered the above noted radiological studies. Interpreted by radiologist. Discussed with radiologist (). Reviewed by me in PACS.    PROCEDURES  Procedures    EKG:         EKG was performed at 1806 and interpreted contemporaneously by me at 1810  Normal sinus rhythm 78  PA, QRS, QT all within normal limits  Diffuse nonspecific T wave abnormalities.  No comparison is available        MEDICATIONS GIVEN IN ER  Medications - No data to display    PROGRESS AND CONSULTS  ED Course as of Jun 07 1915 Fri Jun 07, 2019 1915 The patient has been in normal sinus rhythm in the 70s with a normal blood pressure since arrival here in the ED.  She is asymptomatic.  I reviewed her medication list with her and advised her to choose either Cymbalta or Lexapro to take but not both.  Her labs are all within normal limits, her EKG is unremarkable, and there is no indication of arrhythmia.  [DP]       ED Course User Index  [DP] Juanito Rendon MD           MEDICAL DECISION MAKING  MDM           DIAGNOSIS  Final diagnoses:   Adverse effect of drug, initial encounter         DISPOSITION  Discharge            --  No scribe was used       Juanito Rendon MD  06/07/19 2612

## 2020-03-02 NOTE — PLAN OF CARE
called to overhead page [facility]   Problem: Patient Care Overview (Adult)  Goal: Plan of Care Review  Outcome: Ongoing (interventions implemented as appropriate)    08/15/17 0418   Coping/Psychosocial Response Interventions   Plan Of Care Reviewed With patient   Patient Care Overview   Progress improving   Outcome Evaluation   Outcome Summary/Follow up Plan Pt has been stable postoperatively. Disoriented at times but re-orients easily. Ambulates with walker use and staff assist x1. Voiding per BPR. Pain well managed with PO pain meds. Educated pt on SA management with proper use of CPAP.        Goal: Adult Individualization and Mutuality  Outcome: Ongoing (interventions implemented as appropriate)  Goal: Discharge Needs Assessment  Outcome: Ongoing (interventions implemented as appropriate)    Problem: Perioperative Period (Adult)  Goal: Signs and Symptoms of Listed Potential Problems Will be Absent or Manageable (Perioperative Period)  Outcome: Ongoing (interventions implemented as appropriate)    08/15/17 0418   Perioperative Period   Problems Assessed (Perioperative Period) all   Problems Present (Perioperative Period) pain         Problem: Fall Risk (Adult)  Goal: Identify Related Risk Factors and Signs and Symptoms  Outcome: Outcome(s) achieved Date Met:  08/15/17    08/15/17 0418   Fall Risk   Fall Risk: Related Risk Factors gait/mobility problems;history of falls   Fall Risk: Signs and Symptoms presence of risk factors       Goal: Absence of Falls  Outcome: Ongoing (interventions implemented as appropriate)    08/15/17 0418   Fall Risk (Adult)   Absence of Falls achieves outcome         Problem: Knee Replacement, Total (Adult)  Goal: Signs and Symptoms of Listed Potential Problems Will be Absent or Manageable (Knee Replacement, Total)  Outcome: Ongoing (interventions implemented as appropriate)    08/15/17 0418   Knee Replacement, Total   Problems Assessed (Total Knee Replacement) all   Problems Present (Total Knee Replacement) pain;decreased  range of motion

## (undated) DEVICE — DUAL CUT SAGITTAL BLADE

## (undated) DEVICE — SYR LUERLOK 20CC

## (undated) DEVICE — BANDAGE,GAUZE,BULKEE II,4.5"X4.1YD,STRL: Brand: MEDLINE

## (undated) DEVICE — GLV SURG TRIUMPH CLASSIC PF LTX 8.5 STRL

## (undated) DEVICE — PK KN TOTL 40

## (undated) DEVICE — DRSNG WND GZ CURAD OIL EMULSION 3X8IN LF STRL 1PK

## (undated) DEVICE — PIN TROC SIGNATURE PK/2

## (undated) DEVICE — ENCORE® LATEX ORTHO SIZE 8.5, STERILE LATEX POWDER-FREE SURGICAL GLOVE: Brand: ENCORE

## (undated) DEVICE — STPLR SKIN VISISTAT WD 35CT

## (undated) DEVICE — ANTIBACTERIAL UNDYED BRAIDED (POLYGLACTIN 910), SYNTHETIC ABSORBABLE SUTURE: Brand: COATED VICRYL

## (undated) DEVICE — NDL SPINE 20G 3 1/2 YEL STRL 1P/U

## (undated) DEVICE — APPL DURAPREP IODOPHOR APL 26ML

## (undated) DEVICE — GAUZE,SPONGE,FLUFF,6"X6.75",STRL,10/TRAY: Brand: MEDLINE

## (undated) DEVICE — PREMIUM WET SKIN PREP TRAY: Brand: MEDLINE INDUSTRIES, INC.

## (undated) DEVICE — KT DRN EVAC WND PVC PCH WTROC RND 10F400

## (undated) DEVICE — SOL NACL 0.9PCT 1000ML